# Patient Record
Sex: MALE | Race: ASIAN | NOT HISPANIC OR LATINO | Employment: FULL TIME | URBAN - METROPOLITAN AREA
[De-identification: names, ages, dates, MRNs, and addresses within clinical notes are randomized per-mention and may not be internally consistent; named-entity substitution may affect disease eponyms.]

---

## 2017-03-10 ENCOUNTER — PATIENT MESSAGE (OUTPATIENT)
Dept: INTERNAL MEDICINE | Facility: CLINIC | Age: 72
End: 2017-03-10

## 2017-03-10 ENCOUNTER — LAB VISIT (OUTPATIENT)
Dept: LAB | Facility: HOSPITAL | Age: 72
End: 2017-03-10
Attending: INTERNAL MEDICINE
Payer: MEDICARE

## 2017-03-10 DIAGNOSIS — D62 ACUTE BLOOD LOSS ANEMIA: ICD-10-CM

## 2017-03-10 DIAGNOSIS — D62 ACUTE BLOOD LOSS ANEMIA: Primary | ICD-10-CM

## 2017-03-10 DIAGNOSIS — K57.30 DIVERTICULOSIS OF COLON (WITHOUT MENTION OF HEMORRHAGE): ICD-10-CM

## 2017-03-10 LAB
ALBUMIN SERPL BCP-MCNC: 3.3 G/DL
ALP SERPL-CCNC: 116 U/L
ALT SERPL W/O P-5'-P-CCNC: 7 U/L
ANION GAP SERPL CALC-SCNC: 8 MMOL/L
AST SERPL-CCNC: 20 U/L
BASOPHILS # BLD AUTO: 0.06 K/UL
BASOPHILS NFR BLD: 0.7 %
BILIRUB SERPL-MCNC: 0.5 MG/DL
BUN SERPL-MCNC: 12 MG/DL
CALCIUM SERPL-MCNC: 9.1 MG/DL
CHLORIDE SERPL-SCNC: 106 MMOL/L
CHOLEST/HDLC SERPL: 3.6 {RATIO}
CO2 SERPL-SCNC: 27 MMOL/L
CREAT SERPL-MCNC: 1.3 MG/DL
DIFFERENTIAL METHOD: ABNORMAL
EOSINOPHIL # BLD AUTO: 0.6 K/UL
EOSINOPHIL NFR BLD: 7.1 %
ERYTHROCYTE [DISTWIDTH] IN BLOOD BY AUTOMATED COUNT: 14 %
EST. GFR  (AFRICAN AMERICAN): >60 ML/MIN/1.73 M^2
EST. GFR  (NON AFRICAN AMERICAN): 55.3 ML/MIN/1.73 M^2
ESTIMATED AVG GLUCOSE: 117 MG/DL
FERRITIN SERPL-MCNC: 15 NG/ML
GLUCOSE SERPL-MCNC: 106 MG/DL
HBA1C MFR BLD HPLC: 5.7 %
HCT VFR BLD AUTO: 34.8 %
HDL/CHOLESTEROL RATIO: 27.9 %
HDLC SERPL-MCNC: 104 MG/DL
HDLC SERPL-MCNC: 29 MG/DL
HGB BLD-MCNC: 10.9 G/DL
IRON SERPL-MCNC: 22 UG/DL
LDLC SERPL CALC-MCNC: 42.2 MG/DL
LYMPHOCYTES # BLD AUTO: 1.8 K/UL
LYMPHOCYTES NFR BLD: 20.3 %
MCH RBC QN AUTO: 26 PG
MCHC RBC AUTO-ENTMCNC: 31.3 %
MCV RBC AUTO: 83 FL
MONOCYTES # BLD AUTO: 0.8 K/UL
MONOCYTES NFR BLD: 8.6 %
NEUTROPHILS # BLD AUTO: 5.7 K/UL
NEUTROPHILS NFR BLD: 63.3 %
NONHDLC SERPL-MCNC: 75 MG/DL
PLATELET # BLD AUTO: 261 K/UL
PMV BLD AUTO: 10.5 FL
POTASSIUM SERPL-SCNC: 4.8 MMOL/L
PROSTATE SPECIFIC ANTIGEN, TOTAL: 1.4 NG/ML
PROT SERPL-MCNC: 7.4 G/DL
PSA FREE MFR SERPL: 37.14 %
PSA FREE SERPL-MCNC: 0.52 NG/ML
RBC # BLD AUTO: 4.19 M/UL
SATURATED IRON: 5 %
SODIUM SERPL-SCNC: 141 MMOL/L
TOTAL IRON BINDING CAPACITY: 437 UG/DL
TRANSFERRIN SERPL-MCNC: 295 MG/DL
TRIGL SERPL-MCNC: 164 MG/DL
WBC # BLD AUTO: 9.02 K/UL

## 2017-03-10 PROCEDURE — 83036 HEMOGLOBIN GLYCOSYLATED A1C: CPT

## 2017-03-10 PROCEDURE — 82728 ASSAY OF FERRITIN: CPT

## 2017-03-10 PROCEDURE — 80053 COMPREHEN METABOLIC PANEL: CPT

## 2017-03-10 PROCEDURE — 83540 ASSAY OF IRON: CPT

## 2017-03-10 PROCEDURE — 80061 LIPID PANEL: CPT

## 2017-03-10 PROCEDURE — 84153 ASSAY OF PSA TOTAL: CPT

## 2017-03-10 PROCEDURE — 36415 COLL VENOUS BLD VENIPUNCTURE: CPT | Mod: PO

## 2017-03-10 PROCEDURE — 85025 COMPLETE CBC W/AUTO DIFF WBC: CPT | Mod: PO

## 2017-03-16 ENCOUNTER — OFFICE VISIT (OUTPATIENT)
Dept: INTERNAL MEDICINE | Facility: CLINIC | Age: 72
End: 2017-03-16
Payer: MEDICARE

## 2017-03-16 VITALS
BODY MASS INDEX: 24.45 KG/M2 | WEIGHT: 151.44 LBS | HEART RATE: 82 BPM | OXYGEN SATURATION: 99 % | SYSTOLIC BLOOD PRESSURE: 128 MMHG | DIASTOLIC BLOOD PRESSURE: 70 MMHG

## 2017-03-16 DIAGNOSIS — E78.2 MIXED HYPERLIPIDEMIA: ICD-10-CM

## 2017-03-16 DIAGNOSIS — K57.31 DIVERTICULOSIS OF LARGE INTESTINE WITH HEMORRHAGE: ICD-10-CM

## 2017-03-16 DIAGNOSIS — E11.9 TYPE 2 DIABETES MELLITUS WITHOUT COMPLICATION, WITHOUT LONG-TERM CURRENT USE OF INSULIN: ICD-10-CM

## 2017-03-16 DIAGNOSIS — E55.9 VITAMIN D DEFICIENCY DISEASE: Primary | ICD-10-CM

## 2017-03-16 DIAGNOSIS — I10 ESSENTIAL HYPERTENSION: ICD-10-CM

## 2017-03-16 DIAGNOSIS — D50.9 IRON DEFICIENCY ANEMIA, UNSPECIFIED IRON DEFICIENCY ANEMIA TYPE: ICD-10-CM

## 2017-03-16 PROCEDURE — 99214 OFFICE O/P EST MOD 30 MIN: CPT | Mod: S$PBB,,, | Performed by: INTERNAL MEDICINE

## 2017-03-16 PROCEDURE — 99213 OFFICE O/P EST LOW 20 MIN: CPT | Mod: PBBFAC | Performed by: INTERNAL MEDICINE

## 2017-03-16 PROCEDURE — 99999 PR PBB SHADOW E&M-EST. PATIENT-LVL III: CPT | Mod: PBBFAC,,, | Performed by: INTERNAL MEDICINE

## 2017-03-16 RX ORDER — ERGOCALCIFEROL 1.25 MG/1
50000 CAPSULE ORAL
Qty: 12 CAPSULE | Refills: 3 | Status: SHIPPED | OUTPATIENT
Start: 2017-03-16 | End: 2017-09-19 | Stop reason: SDUPTHER

## 2017-03-17 NOTE — PROGRESS NOTES
This is a well known patient of mine coming back from [place] after having a   diverticular bleed requiring blood transfusion x2, not sure of the mL and then a   subtotal colectomy laparoscopically done in [place] and had a GI bleed in late   January on the 30th of January ended up having a subtotal colectomy.  They tried   embolization of the diverticular bleed, but they could not find it.  He spent   another month in [place] and came back earlier this month, he says he is feeling   well.  Energy level has been improving.  He is not having any abdominal pain.    He has stopped metformin for diabetes and his Cozaar because his blood pressure   has been on the low side.  Blood pressure today is 128/70, hemoglobin A1c is   5.7.  He has lost about 20 pounds with the surgery and afterwards, but his   weight has come back up.  The last time I saw him it was 163 pounds it had come   down to 151.  Recent labs showed that H and H 10.9 and 34.8.  He is a vegetarian   has been taking oral iron.  He lives with his wife who has been taking care of   him.  She has been trying to get him eat more protein.  His albumin recently was   3.3.  He said he is no longer losing weight and has lacked that tendency and   gaining weight back.    REVIEW OF SYSTEMS:  No nausea, vomiting, palpitations.  No PND or orthopnea.    PHYSICAL EXAMINATION:  GENERAL:  He is a well-appearing 71-year-old gentleman in no acute distress.  NECK:  Supple.  CHEST:  Clear without wheeze.  CARDIOVASCULAR:  S1 and S2, regular rate and rhythm.  ABDOMEN:  Soft, nontender.  He had some laparoscopic scars, but bowel sounds are   normal.  LOWER EXTREMITIES:  No edema.  Overall, he looks good and jumps up and off the   table without any difficulty.    ASSESSMENT:  Vitamin D deficiency, diabetes without complication,   hyperlipidemia, hypertension, iron deficiency anemia status post diverticular   hemorrhage and status post subtotal colectomy.  I will get the reports from     Chet, he is going to send it to me, I will go through.  We will monitor his H   and H.  We will monitor blood pressure and his glucose, will leave him off the   losartan and the metformin at the current time.  Continue iron for now.  I will   follow him.  I do recommend restarting his vitamin D though he has gotten off   that and I would recommend starting that back.      YVETTE  dd: 03/16/2017 23:10:12 (CDT)  td: 03/17/2017 07:00:19 (CDT)  Doc ID   #[id number]  Job ID #[id number]    CC:

## 2017-03-22 ENCOUNTER — PATIENT MESSAGE (OUTPATIENT)
Dept: INTERNAL MEDICINE | Facility: CLINIC | Age: 72
End: 2017-03-22

## 2017-03-23 RX ORDER — PANTOPRAZOLE SODIUM 20 MG/1
20 TABLET, DELAYED RELEASE ORAL DAILY
Qty: 30 TABLET | Refills: 0 | Status: SHIPPED | OUTPATIENT
Start: 2017-03-23 | End: 2017-04-19 | Stop reason: SDUPTHER

## 2017-03-23 NOTE — TELEPHONE ENCOUNTER
Please advise       Rashard,  I forgot to mention that I am on  Pantaprazole . Could you kindly send a prescription to [facility]?.   You looked great with the [facility] outfit!!   David

## 2017-03-29 DIAGNOSIS — E11.9 TYPE 2 DIABETES MELLITUS WITHOUT COMPLICATION: ICD-10-CM

## 2017-04-19 RX ORDER — PANTOPRAZOLE SODIUM 20 MG/1
TABLET, DELAYED RELEASE ORAL
Qty: 30 TABLET | Refills: 0 | Status: SHIPPED | OUTPATIENT
Start: 2017-04-19 | End: 2017-06-30 | Stop reason: SDUPTHER

## 2017-05-01 ENCOUNTER — PATIENT MESSAGE (OUTPATIENT)
Dept: ORTHOPEDICS | Facility: CLINIC | Age: 72
End: 2017-05-01

## 2017-05-01 ENCOUNTER — PATIENT MESSAGE (OUTPATIENT)
Dept: INTERNAL MEDICINE | Facility: CLINIC | Age: 72
End: 2017-05-01

## 2017-05-02 ENCOUNTER — TELEPHONE (OUTPATIENT)
Dept: INTERNAL MEDICINE | Facility: CLINIC | Age: 72
End: 2017-05-02

## 2017-06-16 ENCOUNTER — LAB VISIT (OUTPATIENT)
Dept: LAB | Facility: HOSPITAL | Age: 72
End: 2017-06-16
Attending: INTERNAL MEDICINE
Payer: MEDICARE

## 2017-06-16 DIAGNOSIS — E78.2 MIXED HYPERLIPIDEMIA: ICD-10-CM

## 2017-06-16 DIAGNOSIS — E11.9 TYPE 2 DIABETES MELLITUS WITHOUT COMPLICATION, WITHOUT LONG-TERM CURRENT USE OF INSULIN: ICD-10-CM

## 2017-06-16 DIAGNOSIS — E55.9 VITAMIN D DEFICIENCY DISEASE: ICD-10-CM

## 2017-06-16 DIAGNOSIS — D50.9 IRON DEFICIENCY ANEMIA, UNSPECIFIED IRON DEFICIENCY ANEMIA TYPE: ICD-10-CM

## 2017-06-16 LAB
25(OH)D3+25(OH)D2 SERPL-MCNC: 27 NG/ML
ALBUMIN SERPL BCP-MCNC: 3.4 G/DL
ALP SERPL-CCNC: 128 U/L
ALT SERPL W/O P-5'-P-CCNC: 8 U/L
ANION GAP SERPL CALC-SCNC: 6 MMOL/L
AST SERPL-CCNC: 15 U/L
BASOPHILS # BLD AUTO: 0.1 K/UL
BASOPHILS NFR BLD: 1.2 %
BILIRUB SERPL-MCNC: 0.4 MG/DL
BUN SERPL-MCNC: 15 MG/DL
CALCIUM SERPL-MCNC: 8.8 MG/DL
CHLORIDE SERPL-SCNC: 108 MMOL/L
CHOLEST/HDLC SERPL: 4.3 {RATIO}
CO2 SERPL-SCNC: 25 MMOL/L
CREAT SERPL-MCNC: 1.3 MG/DL
DIFFERENTIAL METHOD: ABNORMAL
EOSINOPHIL # BLD AUTO: 1.2 K/UL
EOSINOPHIL NFR BLD: 13.4 %
ERYTHROCYTE [DISTWIDTH] IN BLOOD BY AUTOMATED COUNT: 17.6 %
EST. GFR  (AFRICAN AMERICAN): >60 ML/MIN/1.73 M^2
EST. GFR  (NON AFRICAN AMERICAN): 54.9 ML/MIN/1.73 M^2
ESTIMATED AVG GLUCOSE: 148 MG/DL
GLUCOSE SERPL-MCNC: 125 MG/DL
HBA1C MFR BLD HPLC: 6.8 %
HCT VFR BLD AUTO: 39.5 %
HDL/CHOLESTEROL RATIO: 23.2 %
HDLC SERPL-MCNC: 125 MG/DL
HDLC SERPL-MCNC: 29 MG/DL
HGB BLD-MCNC: 11.9 G/DL
LDLC SERPL CALC-MCNC: ABNORMAL MG/DL
LYMPHOCYTES # BLD AUTO: 2.2 K/UL
LYMPHOCYTES NFR BLD: 25.2 %
MCH RBC QN AUTO: 22.8 PG
MCHC RBC AUTO-ENTMCNC: 30.1 %
MCV RBC AUTO: 76 FL
MONOCYTES # BLD AUTO: 0.7 K/UL
MONOCYTES NFR BLD: 8.1 %
NEUTROPHILS # BLD AUTO: 4.5 K/UL
NEUTROPHILS NFR BLD: 52.1 %
NONHDLC SERPL-MCNC: 96 MG/DL
PLATELET # BLD AUTO: 195 K/UL
PMV BLD AUTO: ABNORMAL FL
POTASSIUM SERPL-SCNC: 5.1 MMOL/L
PROT SERPL-MCNC: 7.4 G/DL
RBC # BLD AUTO: 5.21 M/UL
SODIUM SERPL-SCNC: 139 MMOL/L
TRIGL SERPL-MCNC: 409 MG/DL
WBC # BLD AUTO: 8.6 K/UL

## 2017-06-16 PROCEDURE — 83036 HEMOGLOBIN GLYCOSYLATED A1C: CPT

## 2017-06-16 PROCEDURE — 80053 COMPREHEN METABOLIC PANEL: CPT

## 2017-06-16 PROCEDURE — 80061 LIPID PANEL: CPT

## 2017-06-16 PROCEDURE — 85025 COMPLETE CBC W/AUTO DIFF WBC: CPT

## 2017-06-16 PROCEDURE — 36415 COLL VENOUS BLD VENIPUNCTURE: CPT

## 2017-06-16 PROCEDURE — 82306 VITAMIN D 25 HYDROXY: CPT

## 2017-06-22 ENCOUNTER — OFFICE VISIT (OUTPATIENT)
Dept: INTERNAL MEDICINE | Facility: CLINIC | Age: 72
End: 2017-06-22
Payer: MEDICARE

## 2017-06-22 VITALS
SYSTOLIC BLOOD PRESSURE: 123 MMHG | DIASTOLIC BLOOD PRESSURE: 71 MMHG | OXYGEN SATURATION: 97 % | HEART RATE: 74 BPM | WEIGHT: 160.06 LBS | HEIGHT: 66 IN | BODY MASS INDEX: 25.72 KG/M2

## 2017-06-22 DIAGNOSIS — I77.9 RIGHT-SIDED CAROTID ARTERY DISEASE: ICD-10-CM

## 2017-06-22 DIAGNOSIS — D64.9 ANEMIA, UNSPECIFIED TYPE: ICD-10-CM

## 2017-06-22 DIAGNOSIS — E11.9 TYPE 2 DIABETES MELLITUS WITHOUT COMPLICATION, WITHOUT LONG-TERM CURRENT USE OF INSULIN: ICD-10-CM

## 2017-06-22 DIAGNOSIS — I10 ESSENTIAL HYPERTENSION: Primary | ICD-10-CM

## 2017-06-22 DIAGNOSIS — I67.2 CEREBRAL ATHEROSCLEROSIS: ICD-10-CM

## 2017-06-22 DIAGNOSIS — E78.2 MIXED DYSLIPIDEMIA: ICD-10-CM

## 2017-06-22 LAB
CREAT UR-MCNC: 154 MG/DL
MICROALBUMIN UR DL<=1MG/L-MCNC: 14 UG/ML
MICROALBUMIN/CREATININE RATIO: 9.1 UG/MG

## 2017-06-22 PROCEDURE — 82570 ASSAY OF URINE CREATININE: CPT

## 2017-06-22 PROCEDURE — 99214 OFFICE O/P EST MOD 30 MIN: CPT | Mod: S$PBB,,, | Performed by: INTERNAL MEDICINE

## 2017-06-22 PROCEDURE — 3044F HG A1C LEVEL LT 7.0%: CPT | Mod: ,,, | Performed by: INTERNAL MEDICINE

## 2017-06-22 PROCEDURE — 4010F ACE/ARB THERAPY RXD/TAKEN: CPT | Mod: ,,, | Performed by: INTERNAL MEDICINE

## 2017-06-22 PROCEDURE — 1126F AMNT PAIN NOTED NONE PRSNT: CPT | Mod: ,,, | Performed by: INTERNAL MEDICINE

## 2017-06-22 PROCEDURE — 99999 PR PBB SHADOW E&M-EST. PATIENT-LVL IV: CPT | Mod: PBBFAC,,, | Performed by: INTERNAL MEDICINE

## 2017-06-22 PROCEDURE — 1159F MED LIST DOCD IN RCRD: CPT | Mod: ,,, | Performed by: INTERNAL MEDICINE

## 2017-06-22 PROCEDURE — 99214 OFFICE O/P EST MOD 30 MIN: CPT | Mod: PBBFAC | Performed by: INTERNAL MEDICINE

## 2017-06-22 RX ORDER — LOSARTAN POTASSIUM 25 MG/1
25 TABLET ORAL DAILY
COMMUNITY
End: 2017-09-19 | Stop reason: SDUPTHER

## 2017-06-22 NOTE — PROGRESS NOTES
Answers for HPI/ROS submitted by the patient on 6/21/2017   Diabetes problem  Diabetes type: type 2  MedicAlert ID: No  Disease duration: 5 years  blurred vision: No  chest pain: No  fatigue: No  foot paresthesias: No  foot ulcerations: No  polydipsia: Yes  polyphagia: Yes  polyuria: No  visual change: No  weakness: No  weight loss: Yes  Symptom course: stable  confusion: No  dizziness: No  headaches: No  hunger: Yes  mood changes: No  nervous/anxious: No  pallor: Yes  seizures: No  sleepiness: No  speech difficulty: No  sweats: No  tremors: No  blackouts: No  hospitalization: No  nocturnal hypoglycemia: No  required assistance: No  required glucagon: No  CVA: No  heart disease: No  impotence: Yes  nephropathy: No  peripheral neuropathy: No  PVD: No  retinopathy: No  autonomic neuropathy: No  CAD risks: stress, diabetes mellitus, male sex  Current treatments: oral agent (monotherapy)  Treatment compliance: all of the time  Blood glucose trend: fluctuating minimally  Weight trend: decreasing steadily  Current diet: vegetarian  Meal planning: none  Exercise: weekly  Dietitian visit: No  Eye exam current: No  Sees podiatrist: No  HISTORY OF PRESENT ILLNESS:  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  Had a little incident   where he went to [place] last year, had significant diverticulosis episode,   hospitalized and had a hemicolectomy, doing well.  He had lost about 15 pounds.    He gained about 9 pounds back already.  He says he has been eating more.  He is   a vegetarian, but gained about 9 pounds back.  His glucose for his diabetes has   shown increase also.  Glucose fasting is 125, hemoglobin A1c up to 6.8, last   time   5.7 after his incident, normally he runs around 6.3, so it is not   really that big difference.  His triglycerides have bumped up despite having   diabetes.  He is not on a statin.  With his triglycerides being high, I cannot   tell  what his LDL looks like.  At this time, it usually runs around 42-49, so he   did not qualify for a statin.  He does have a history of carotid stenosis  ,   has not had a carotid ultrasound in a while.  Last one was in 2012.  He has had   no CVA or TIA type symptoms.  He has also had some mild anemia.  Says he has not   been taking any iron since he has lost a lot of blood, requiring blood   transfusions before last visit, but his H and H is actually going up.  H and H   recently was 11.9 and 35.7, MCV has dropped down to 76.  Otherwise, he says he   is feeling well.  No new complaints.  He has started back on his losartan for   his hypertension, 25 mg a day.  Blood pressure today is 123/71.  He gotten off   of it for a while after he lost all his weight, but since he has gained some of   them back, his blood pressure has come back up.  He has not has any eosinophilia   that he says he has had forever 20 or 30 years.  He is not having any B-type   symptoms, not losing weight, no night sweats.  He also has vitamin D deficiency   for which he is taking vitamin D.    PHYSICAL EXAMINATION:  GENERAL:  Well-appearing  gentleman in no acute distress.  HEENT:  Ear canals are open.  TMs are clear.  Oropharynx is clear.  NECK:  Supple.  No JVD.  LUNGS:  Clear.  CARDIAC:  S1, S2.  ABDOMEN:  Soft.  LOWER EXTREMITIES:  No edema.  Does have neuroma on top of his left foot.    ASSESSMENT:  1.  Diabetes mellitus type 2.  Foot exam done today.  We will have him see  .    Hemoglobin A1c and CMP again in three months.  Watch his diet.  2.  Hypertension.  Continue losartan.  3.  History of carotid artery stenosis.  We are going to get a carotid artery   ultrasound, so we will order carotid ultrasound.  We will check the urine today.    Continue vitamin D.  We will check CBC.  He needs to start taking a little bit   iron in his diet and I will see him again in five months with labs.      TERESA/IN  dd: 06/22/2017 08:40:39 (CDT)  td:  06/23/2017 00:43:51 (CDT)  Doc ID   #[id number]  Job ID #[id number]    CC:        Protective Sensation (w/ 10 gram monofilament):  Right: Intact  Left: Intact    Visual Inspection:  Normal -  Bilateral    Pedal Pulses:   Right: Present  Left: Present    Posterior tibialis:   Right:Present  Left: Present

## 2017-06-30 RX ORDER — PANTOPRAZOLE SODIUM 20 MG/1
TABLET, DELAYED RELEASE ORAL
Qty: 30 TABLET | Refills: 0 | Status: SHIPPED | OUTPATIENT
Start: 2017-06-30 | End: 2017-07-31 | Stop reason: SDUPTHER

## 2017-07-07 ENCOUNTER — PATIENT MESSAGE (OUTPATIENT)
Dept: ORTHOPEDICS | Facility: CLINIC | Age: 72
End: 2017-07-07

## 2017-08-01 RX ORDER — PANTOPRAZOLE SODIUM 20 MG/1
TABLET, DELAYED RELEASE ORAL
Qty: 30 TABLET | Refills: 0 | Status: SHIPPED | OUTPATIENT
Start: 2017-08-01 | End: 2017-09-04 | Stop reason: SDUPTHER

## 2017-08-31 ENCOUNTER — OFFICE VISIT (OUTPATIENT)
Dept: OPTOMETRY | Facility: CLINIC | Age: 72
End: 2017-08-31
Payer: MEDICARE

## 2017-08-31 DIAGNOSIS — H43.812 PVD (POSTERIOR VITREOUS DETACHMENT), LEFT EYE: Primary | ICD-10-CM

## 2017-08-31 PROCEDURE — 99499 UNLISTED E&M SERVICE: CPT | Mod: S$PBB,,, | Performed by: OPTOMETRIST

## 2017-08-31 PROCEDURE — 99212 OFFICE O/P EST SF 10 MIN: CPT | Mod: PBBFAC,PO | Performed by: OPTOMETRIST

## 2017-08-31 PROCEDURE — 99999 PR PBB SHADOW E&M-EST. PATIENT-LVL II: CPT | Mod: PBBFAC,,, | Performed by: OPTOMETRIST

## 2017-08-31 NOTE — PATIENT INSTRUCTIONS
Posterior Vitreous Detachment    Posterior vitreous detachment, or PVD for short, is a degenerative change in the vitreous humor of the eye. It is very common and will occur in virtually everybody as age catches up. Most of the time, it occurs without causing any acute or long-term visual problems.  PVD is a natural change that occurs with age, usually starting from the age of 40. PVD happens in over 75% of those aged 65 or over. It is important to remember that PVD is not an ocular disease nor is it a sign of any impending eye health issue, unless it occurs at a relatively young age.      WHAT IS POSTERIOR VITREOUS DETACHMENT?  The vitreous humor is a transparent, gel-like substance that occupies entire back portion of the eyeball. This portion of the eye is called... (no prizes for guessing)... the vitreous cavity. At birth, the vitreous humor fills up the entire space in the cavity. With age, the vitreous degenerates and is no longer able to keep its original consistency and shape. When this happens, the vitreous shrinks and pulls away from the retina at the back wall of the eye. This separation of vitreous from retina is called posterior vitreous detachment.

## 2017-08-31 NOTE — PROGRESS NOTES
MARGE Rosenberg is a 72 y.o. Male who comes in for urgent eye care.    He reports that he has noticed a hair-like floater in his left eye.  This   has been happening for 1-2 weeks. There are no other associated symptoms.    (--)blurred vision  (--)Headaches  (--)diplopia  (--)flashes  (+)floaters  (--)pain  (--)Itching  (--)tearing  (--)burning  (--)Dryness  (--) OTC Drops  (--)Photophobia      Last edited by Harjit Gregg, OD on 8/31/2017  9:09 AM. (History)        ROS     Positive for: Gastrointestinal (h/o diverticulitis), Neurological   (migraine headaches), Skin (h/o non-melanoma skin cancer), Genitourinary   (Bph), Endocrine (Type 2 Diabetes), Cardiovascular (CAD, HLD, HTN), Eyes   (floater OS), Respiratory (asthma), Allergic/Imm (codeine, fenofibrate,   lisinopril, shellfish)    Negative for: Constitutional, Musculoskeletal, HENT, Psychiatric,   Heme/Lymph    Last edited by Harjit Gregg, OD on 8/31/2017  9:13 AM. (History)        Assessment /Plan     For exam results, see Encounter Report.    PVD (posterior vitreous detachment), left eye  - Retinal health intact  - Informed on continued risk of retinal detachment, holes, and tears.. Advised to Return to clinic, or Emergency room after hours or on weekends,  immediately with any new spontaneous flashes of light, a vail of gray, black or other color come over  vision, or any new floaters    - Recheck in 1 month      Patient education; RTC in 1 month for PVD check (DFE OS)

## 2017-09-04 RX ORDER — PANTOPRAZOLE SODIUM 20 MG/1
TABLET, DELAYED RELEASE ORAL
Qty: 30 TABLET | Refills: 3 | Status: SHIPPED | OUTPATIENT
Start: 2017-09-04 | End: 2017-09-19 | Stop reason: SDUPTHER

## 2017-09-08 ENCOUNTER — OFFICE VISIT (OUTPATIENT)
Dept: OPHTHALMOLOGY | Facility: CLINIC | Age: 72
End: 2017-09-08
Payer: MEDICARE

## 2017-09-08 DIAGNOSIS — H43.813 PVD (POSTERIOR VITREOUS DETACHMENT), BILATERAL: Primary | ICD-10-CM

## 2017-09-08 PROCEDURE — 99212 OFFICE O/P EST SF 10 MIN: CPT | Mod: PBBFAC | Performed by: OPHTHALMOLOGY

## 2017-09-08 PROCEDURE — 92012 INTRM OPH EXAM EST PATIENT: CPT | Mod: S$PBB,,, | Performed by: OPHTHALMOLOGY

## 2017-09-08 PROCEDURE — 99999 PR PBB SHADOW E&M-EST. PATIENT-LVL II: CPT | Mod: PBBFAC,,, | Performed by: OPHTHALMOLOGY

## 2017-09-08 NOTE — PROGRESS NOTES
HPI     Eye Problem    Additional comments: PVD f/u           Comments   DLS 8/31/17     Pt states the flashes are gone and no floaters. Declines dilation today,   was unable to see for 2 days last time. (they used Cyclo).         Last edited by Princess Virgen on 9/8/2017  1:37 PM. (History)            Assessment /Plan     For exam results, see Encounter Report.    PVD (posterior vitreous detachment), bilateral      Etiology of PVD discussed. Signs/symptoms of retinal tear and detachment reviewed.  Patient advised to return urgently if signs of retinal detachment occur.

## 2017-09-18 ENCOUNTER — PATIENT MESSAGE (OUTPATIENT)
Dept: CARDIOLOGY | Facility: CLINIC | Age: 72
End: 2017-09-18

## 2017-09-18 ENCOUNTER — PATIENT MESSAGE (OUTPATIENT)
Dept: ORTHOPEDICS | Facility: CLINIC | Age: 72
End: 2017-09-18

## 2017-09-18 ENCOUNTER — PATIENT MESSAGE (OUTPATIENT)
Dept: INTERNAL MEDICINE | Facility: CLINIC | Age: 72
End: 2017-09-18

## 2017-09-19 ENCOUNTER — PATIENT MESSAGE (OUTPATIENT)
Dept: ORTHOPEDICS | Facility: CLINIC | Age: 72
End: 2017-09-19

## 2017-09-19 RX ORDER — ERGOCALCIFEROL 1.25 MG/1
50000 CAPSULE ORAL
Qty: 12 CAPSULE | Refills: 3 | Status: SHIPPED | OUTPATIENT
Start: 2017-09-19 | End: 2019-04-15 | Stop reason: SDUPTHER

## 2017-09-19 RX ORDER — LOSARTAN POTASSIUM 25 MG/1
25 TABLET ORAL DAILY
Qty: 90 TABLET | Refills: 3 | Status: SHIPPED | OUTPATIENT
Start: 2017-09-19 | End: 2017-10-03 | Stop reason: SDUPTHER

## 2017-09-19 RX ORDER — PANTOPRAZOLE SODIUM 20 MG/1
20 TABLET, DELAYED RELEASE ORAL DAILY
Qty: 90 TABLET | Refills: 3 | Status: SHIPPED | OUTPATIENT
Start: 2017-09-19 | End: 2017-10-03 | Stop reason: SDUPTHER

## 2017-09-19 NOTE — TELEPHONE ENCOUNTER
Can you put in an order for urology and cardiology. Are the labs that you ordered on 6/22 okay to still schedule ? Please advise, thanks.

## 2017-09-20 ENCOUNTER — OFFICE VISIT (OUTPATIENT)
Dept: ORTHOPEDICS | Facility: CLINIC | Age: 72
End: 2017-09-20
Payer: MEDICARE

## 2017-09-20 ENCOUNTER — HOSPITAL ENCOUNTER (OUTPATIENT)
Dept: RADIOLOGY | Facility: HOSPITAL | Age: 72
Discharge: HOME | End: 2017-09-20
Attending: ORTHOPAEDIC SURGERY
Payer: MEDICARE

## 2017-09-20 DIAGNOSIS — M79.672 LEFT FOOT PAIN: ICD-10-CM

## 2017-09-20 DIAGNOSIS — M67.472 GANGLION CYST OF LEFT FOOT: ICD-10-CM

## 2017-09-20 DIAGNOSIS — M79.645 PAIN OF LEFT THUMB: ICD-10-CM

## 2017-09-20 DIAGNOSIS — M79.672 LEFT FOOT PAIN: Primary | ICD-10-CM

## 2017-09-20 PROCEDURE — 99999 PR PBB SHADOW E&M-EST. PATIENT-LVL I: CPT | Mod: PBBFAC,,, | Performed by: ORTHOPAEDIC SURGERY

## 2017-09-20 PROCEDURE — 73630 X-RAY EXAM OF FOOT: CPT | Mod: 26,LT,, | Performed by: RADIOLOGY

## 2017-09-20 PROCEDURE — 73130 X-RAY EXAM OF HAND: CPT | Mod: TC,LT

## 2017-09-20 PROCEDURE — 99213 OFFICE O/P EST LOW 20 MIN: CPT | Mod: S$PBB,,, | Performed by: ORTHOPAEDIC SURGERY

## 2017-09-20 PROCEDURE — 1159F MED LIST DOCD IN RCRD: CPT | Mod: ,,, | Performed by: ORTHOPAEDIC SURGERY

## 2017-09-20 PROCEDURE — 73630 X-RAY EXAM OF FOOT: CPT | Mod: TC,LT

## 2017-09-20 PROCEDURE — 73130 X-RAY EXAM OF HAND: CPT | Mod: 26,LT,, | Performed by: RADIOLOGY

## 2017-09-20 PROCEDURE — 99211 OFF/OP EST MAY X REQ PHY/QHP: CPT | Mod: PBBFAC,25 | Performed by: ORTHOPAEDIC SURGERY

## 2017-09-21 NOTE — PROGRESS NOTES
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  I had seen   him previously for this problem and we had discussed possible surgical excision   of the cyst if the pain persisted.  He states it has been bothering him lately.    In addition, he reports some discomfort around the base of his left thumb, not   associated with any recent injury.  He wanted me to have a look at this as well.    Examination of his left foot reveals obvious prominence of the midfoot with a   cystic type feeling on palpation with some mild tenderness.  He has bilateral   high arches.  There is no sign of infection.  His left thumb reveals some   tenderness around the CMC joint of the left thumb.  He has good motion and no   obvious instability.  I x-rayed his left foot again today and he indeed has   cavus feet with soft tissue prominence in the midfoot.  He does have some   degenerative changes of the second and third tarsometatarsal joints.  I also   x-rayed his thumb today and although I am not that experienced with hands, it   appears there is some subluxation of the first carpometacarpal joint.  With   regards to his foot, he is going to decide if and when he wants to have the cyst   removed.  With regards to his thumb, I am going to refer him to see Dr. Martínez.      TERRY/SUPRIYA  dd: 09/20/2017 13:57:12 (CDT)  td: 09/21/2017 07:00:25 (CDT)  Doc ID   #[id number]  Job ID #[id number]    CC:

## 2017-10-03 RX ORDER — PANTOPRAZOLE SODIUM 20 MG/1
20 TABLET, DELAYED RELEASE ORAL DAILY
Qty: 90 TABLET | Refills: 3 | Status: SHIPPED | OUTPATIENT
Start: 2017-10-03 | End: 2018-10-10

## 2017-10-03 RX ORDER — LOSARTAN POTASSIUM 25 MG/1
25 TABLET ORAL DAILY
Qty: 90 TABLET | Refills: 3 | Status: SHIPPED | OUTPATIENT
Start: 2017-10-03 | End: 2018-04-28

## 2017-10-13 ENCOUNTER — TELEPHONE (OUTPATIENT)
Dept: OPTOMETRY | Facility: CLINIC | Age: 72
End: 2017-10-13

## 2017-10-13 NOTE — TELEPHONE ENCOUNTER
Called pt and left message if he would like to schedule 1 month F/U appointment for PVD with Dr Gregg but noticed that he had seen Dr Gregg in the interim.

## 2018-02-28 DIAGNOSIS — E78.2 MIXED DYSLIPIDEMIA: ICD-10-CM

## 2018-02-28 DIAGNOSIS — Z12.5 ENCOUNTER FOR SCREENING FOR MALIGNANT NEOPLASM OF PROSTATE: ICD-10-CM

## 2018-02-28 DIAGNOSIS — E11.9 TYPE 2 DIABETES MELLITUS WITHOUT COMPLICATION, WITHOUT LONG-TERM CURRENT USE OF INSULIN: Primary | ICD-10-CM

## 2018-02-28 DIAGNOSIS — I77.9 RIGHT-SIDED CAROTID ARTERY DISEASE: ICD-10-CM

## 2018-02-28 DIAGNOSIS — I10 ESSENTIAL HYPERTENSION: ICD-10-CM

## 2018-03-01 ENCOUNTER — LAB VISIT (OUTPATIENT)
Dept: LAB | Facility: HOSPITAL | Age: 73
End: 2018-03-01
Attending: INTERNAL MEDICINE
Payer: MEDICARE

## 2018-03-01 DIAGNOSIS — I10 ESSENTIAL HYPERTENSION: ICD-10-CM

## 2018-03-01 DIAGNOSIS — E78.2 MIXED DYSLIPIDEMIA: ICD-10-CM

## 2018-03-01 DIAGNOSIS — Z12.5 ENCOUNTER FOR SCREENING FOR MALIGNANT NEOPLASM OF PROSTATE: ICD-10-CM

## 2018-03-01 DIAGNOSIS — E11.9 TYPE 2 DIABETES MELLITUS WITHOUT COMPLICATION, WITHOUT LONG-TERM CURRENT USE OF INSULIN: ICD-10-CM

## 2018-03-01 DIAGNOSIS — I77.9 RIGHT-SIDED CAROTID ARTERY DISEASE: ICD-10-CM

## 2018-03-01 LAB
ALBUMIN SERPL BCP-MCNC: 3.5 G/DL
ALP SERPL-CCNC: 138 U/L
ALT SERPL W/O P-5'-P-CCNC: 14 U/L
ANION GAP SERPL CALC-SCNC: 8 MMOL/L
AST SERPL-CCNC: 17 U/L
BASOPHILS # BLD AUTO: 0.06 K/UL
BASOPHILS NFR BLD: 0.6 %
BILIRUB SERPL-MCNC: 0.4 MG/DL
BUN SERPL-MCNC: 15 MG/DL
CALCIUM SERPL-MCNC: 9.1 MG/DL
CHLORIDE SERPL-SCNC: 109 MMOL/L
CHOLEST SERPL-MCNC: 145 MG/DL
CHOLEST/HDLC SERPL: 5.2 {RATIO}
CO2 SERPL-SCNC: 25 MMOL/L
COMPLEXED PSA SERPL-MCNC: 6.3 NG/ML
CREAT SERPL-MCNC: 1.3 MG/DL
DIFFERENTIAL METHOD: ABNORMAL
EOSINOPHIL # BLD AUTO: 1 K/UL
EOSINOPHIL NFR BLD: 10.2 %
ERYTHROCYTE [DISTWIDTH] IN BLOOD BY AUTOMATED COUNT: 14.2 %
EST. GFR  (AFRICAN AMERICAN): >60 ML/MIN/1.73 M^2
EST. GFR  (NON AFRICAN AMERICAN): 54.9 ML/MIN/1.73 M^2
ESTIMATED AVG GLUCOSE: 128 MG/DL
GLUCOSE SERPL-MCNC: 134 MG/DL
HBA1C MFR BLD HPLC: 6.1 %
HCT VFR BLD AUTO: 45 %
HDLC SERPL-MCNC: 28 MG/DL
HDLC SERPL: 19.3 %
HGB BLD-MCNC: 14.2 G/DL
IMM GRANULOCYTES # BLD AUTO: 0.04 K/UL
IMM GRANULOCYTES NFR BLD AUTO: 0.4 %
LDLC SERPL CALC-MCNC: ABNORMAL MG/DL
LYMPHOCYTES # BLD AUTO: 1.9 K/UL
LYMPHOCYTES NFR BLD: 20.4 %
MCH RBC QN AUTO: 27.1 PG
MCHC RBC AUTO-ENTMCNC: 31.6 G/DL
MCV RBC AUTO: 86 FL
MONOCYTES # BLD AUTO: 0.7 K/UL
MONOCYTES NFR BLD: 6.9 %
NEUTROPHILS # BLD AUTO: 5.8 K/UL
NEUTROPHILS NFR BLD: 61.5 %
NONHDLC SERPL-MCNC: 117 MG/DL
NRBC BLD-RTO: 0 /100 WBC
PLATELET # BLD AUTO: 169 K/UL
PMV BLD AUTO: 11.8 FL
POTASSIUM SERPL-SCNC: 5.3 MMOL/L
PROT SERPL-MCNC: 7.5 G/DL
RBC # BLD AUTO: 5.24 M/UL
SODIUM SERPL-SCNC: 142 MMOL/L
TRIGL SERPL-MCNC: 633 MG/DL
URATE SERPL-MCNC: 6.6 MG/DL
WBC # BLD AUTO: 9.42 K/UL

## 2018-03-01 PROCEDURE — 80061 LIPID PANEL: CPT

## 2018-03-01 PROCEDURE — 84550 ASSAY OF BLOOD/URIC ACID: CPT

## 2018-03-01 PROCEDURE — 84153 ASSAY OF PSA TOTAL: CPT

## 2018-03-01 PROCEDURE — 85025 COMPLETE CBC W/AUTO DIFF WBC: CPT

## 2018-03-01 PROCEDURE — 36415 COLL VENOUS BLD VENIPUNCTURE: CPT

## 2018-03-01 PROCEDURE — 83036 HEMOGLOBIN GLYCOSYLATED A1C: CPT

## 2018-03-01 PROCEDURE — 80053 COMPREHEN METABOLIC PANEL: CPT

## 2018-03-02 ENCOUNTER — TELEPHONE (OUTPATIENT)
Dept: CARDIOLOGY | Facility: CLINIC | Age: 73
End: 2018-03-02

## 2018-03-02 NOTE — TELEPHONE ENCOUNTER
Called Mr. Rosenberg and discussed lab results. Was notified of elevated PSA level and was told to go see a PCP or urologist. He stated his lab levels were fasting but ate a very large meal late the night before the lab work was drawn. He has already called his PCP to set up an appointment for follow up. All questions answered.

## 2018-04-13 ENCOUNTER — OFFICE VISIT (OUTPATIENT)
Dept: INTERNAL MEDICINE | Facility: CLINIC | Age: 73
End: 2018-04-13
Payer: MEDICARE

## 2018-04-13 VITALS
DIASTOLIC BLOOD PRESSURE: 70 MMHG | HEART RATE: 74 BPM | BODY MASS INDEX: 27.36 KG/M2 | WEIGHT: 164.25 LBS | SYSTOLIC BLOOD PRESSURE: 120 MMHG | HEIGHT: 65 IN

## 2018-04-13 DIAGNOSIS — I77.9 RIGHT-SIDED CAROTID ARTERY DISEASE: ICD-10-CM

## 2018-04-13 DIAGNOSIS — I77.89 OTHER SPECIFIED DISORDERS OF ARTERIES AND ARTERIOLES: ICD-10-CM

## 2018-04-13 DIAGNOSIS — E11.9 TYPE 2 DIABETES MELLITUS WITHOUT COMPLICATION, WITHOUT LONG-TERM CURRENT USE OF INSULIN: ICD-10-CM

## 2018-04-13 DIAGNOSIS — N40.0 BPH WITHOUT URINARY OBSTRUCTION: Primary | ICD-10-CM

## 2018-04-13 DIAGNOSIS — E78.2 MIXED DYSLIPIDEMIA: ICD-10-CM

## 2018-04-13 LAB
BILIRUB UR QL STRIP: NEGATIVE
CLARITY UR REFRACT.AUTO: CLEAR
COLOR UR AUTO: YELLOW
CREAT UR-MCNC: 102 MG/DL
GLUCOSE UR QL STRIP: NEGATIVE
HGB UR QL STRIP: NEGATIVE
KETONES UR QL STRIP: NEGATIVE
LEUKOCYTE ESTERASE UR QL STRIP: NEGATIVE
MICROALBUMIN UR DL<=1MG/L-MCNC: 9 UG/ML
MICROALBUMIN/CREATININE RATIO: 8.8 UG/MG
MICROSCOPIC COMMENT: NORMAL
NITRITE UR QL STRIP: NEGATIVE
PH UR STRIP: 5 [PH] (ref 5–8)
PROT UR QL STRIP: NEGATIVE
SP GR UR STRIP: 1.01 (ref 1–1.03)
URN SPEC COLLECT METH UR: NORMAL
UROBILINOGEN UR STRIP-ACNC: NEGATIVE EU/DL
WBC #/AREA URNS AUTO: 3 /HPF (ref 0–5)

## 2018-04-13 PROCEDURE — 82043 UR ALBUMIN QUANTITATIVE: CPT

## 2018-04-13 PROCEDURE — 81001 URINALYSIS AUTO W/SCOPE: CPT

## 2018-04-13 PROCEDURE — 99999 PR PBB SHADOW E&M-EST. PATIENT-LVL III: CPT | Mod: PBBFAC,,, | Performed by: INTERNAL MEDICINE

## 2018-04-13 PROCEDURE — 99213 OFFICE O/P EST LOW 20 MIN: CPT | Mod: PBBFAC | Performed by: INTERNAL MEDICINE

## 2018-04-13 PROCEDURE — 99214 OFFICE O/P EST MOD 30 MIN: CPT | Mod: S$PBB,,, | Performed by: INTERNAL MEDICINE

## 2018-04-15 ENCOUNTER — PATIENT MESSAGE (OUTPATIENT)
Dept: CARDIOLOGY | Facility: CLINIC | Age: 73
End: 2018-04-15

## 2018-04-16 ENCOUNTER — LAB VISIT (OUTPATIENT)
Dept: LAB | Facility: HOSPITAL | Age: 73
End: 2018-04-16
Attending: INTERNAL MEDICINE
Payer: MEDICARE

## 2018-04-16 DIAGNOSIS — E11.9 TYPE 2 DIABETES MELLITUS WITHOUT COMPLICATION, WITHOUT LONG-TERM CURRENT USE OF INSULIN: ICD-10-CM

## 2018-04-16 DIAGNOSIS — I77.9 RIGHT-SIDED CAROTID ARTERY DISEASE: Primary | ICD-10-CM

## 2018-04-16 DIAGNOSIS — E78.2 MIXED DYSLIPIDEMIA: ICD-10-CM

## 2018-04-16 DIAGNOSIS — N40.0 BPH WITHOUT URINARY OBSTRUCTION: ICD-10-CM

## 2018-04-16 LAB
ANION GAP SERPL CALC-SCNC: 6 MMOL/L
BUN SERPL-MCNC: 12 MG/DL
CALCIUM SERPL-MCNC: 8.9 MG/DL
CHLORIDE SERPL-SCNC: 108 MMOL/L
CHOLEST SERPL-MCNC: 143 MG/DL
CHOLEST/HDLC SERPL: 4.8 {RATIO}
CO2 SERPL-SCNC: 26 MMOL/L
CREAT SERPL-MCNC: 1.2 MG/DL
EST. GFR  (AFRICAN AMERICAN): >60 ML/MIN/1.73 M^2
EST. GFR  (NON AFRICAN AMERICAN): >60 ML/MIN/1.73 M^2
ESTIMATED AVG GLUCOSE: 137 MG/DL
GLUCOSE SERPL-MCNC: 141 MG/DL
HBA1C MFR BLD HPLC: 6.4 %
HDLC SERPL-MCNC: 30 MG/DL
HDLC SERPL: 21 %
LDLC SERPL CALC-MCNC: 38.8 MG/DL
NONHDLC SERPL-MCNC: 113 MG/DL
POTASSIUM SERPL-SCNC: 5.6 MMOL/L
PROSTATE SPECIFIC ANTIGEN, TOTAL: 4.7 NG/ML
PSA FREE MFR SERPL: 40.64 %
PSA FREE SERPL-MCNC: 1.91 NG/ML
SODIUM SERPL-SCNC: 140 MMOL/L
TRIGL SERPL-MCNC: 371 MG/DL
URATE SERPL-MCNC: 6.9 MG/DL

## 2018-04-16 PROCEDURE — 80061 LIPID PANEL: CPT

## 2018-04-16 PROCEDURE — 80048 BASIC METABOLIC PNL TOTAL CA: CPT

## 2018-04-16 PROCEDURE — 83036 HEMOGLOBIN GLYCOSYLATED A1C: CPT

## 2018-04-16 PROCEDURE — 36415 COLL VENOUS BLD VENIPUNCTURE: CPT

## 2018-04-16 PROCEDURE — 84550 ASSAY OF BLOOD/URIC ACID: CPT

## 2018-04-16 PROCEDURE — 84154 ASSAY OF PSA FREE: CPT

## 2018-04-17 ENCOUNTER — HOSPITAL ENCOUNTER (OUTPATIENT)
Dept: CARDIOLOGY | Facility: CLINIC | Age: 73
Discharge: HOME | End: 2018-04-17
Attending: NURSE PRACTITIONER
Payer: MEDICARE

## 2018-04-17 ENCOUNTER — OFFICE VISIT (OUTPATIENT)
Dept: CARDIOLOGY | Facility: CLINIC | Age: 73
End: 2018-04-17
Payer: MEDICARE

## 2018-04-17 VITALS
HEIGHT: 65 IN | OXYGEN SATURATION: 98 % | WEIGHT: 162.94 LBS | BODY MASS INDEX: 27.15 KG/M2 | DIASTOLIC BLOOD PRESSURE: 69 MMHG | SYSTOLIC BLOOD PRESSURE: 106 MMHG | HEART RATE: 81 BPM

## 2018-04-17 DIAGNOSIS — N40.0 BPH WITHOUT URINARY OBSTRUCTION: ICD-10-CM

## 2018-04-17 DIAGNOSIS — E78.2 MIXED DYSLIPIDEMIA: ICD-10-CM

## 2018-04-17 DIAGNOSIS — I10 ESSENTIAL HYPERTENSION: ICD-10-CM

## 2018-04-17 DIAGNOSIS — E11.9 TYPE 2 DIABETES MELLITUS WITHOUT COMPLICATION, WITHOUT LONG-TERM CURRENT USE OF INSULIN: ICD-10-CM

## 2018-04-17 DIAGNOSIS — I77.9 RIGHT-SIDED CAROTID ARTERY DISEASE: ICD-10-CM

## 2018-04-17 DIAGNOSIS — I77.9 BILATERAL CAROTID ARTERY DISEASE: ICD-10-CM

## 2018-04-17 PROCEDURE — 99213 OFFICE O/P EST LOW 20 MIN: CPT | Mod: PBBFAC | Performed by: INTERNAL MEDICINE

## 2018-04-17 PROCEDURE — 99999 PR PBB SHADOW E&M-EST. PATIENT-LVL III: CPT | Mod: PBBFAC,,, | Performed by: INTERNAL MEDICINE

## 2018-04-17 PROCEDURE — 99214 OFFICE O/P EST MOD 30 MIN: CPT | Mod: S$PBB,,, | Performed by: INTERNAL MEDICINE

## 2018-04-17 RX ORDER — FENOFIBRATE 160 MG/1
160 TABLET ORAL DAILY
Qty: 90 TABLET | Refills: 3 | Status: SHIPPED | OUTPATIENT
Start: 2018-04-17 | End: 2019-01-29

## 2018-04-17 RX ORDER — NITROGLYCERIN 0.4 MG/1
0.4 TABLET SUBLINGUAL EVERY 5 MIN PRN
Qty: 30 TABLET | Refills: 11 | Status: SHIPPED | OUTPATIENT
Start: 2018-04-17 | End: 2019-04-23 | Stop reason: SDUPTHER

## 2018-04-17 NOTE — ASSESSMENT & PLAN NOTE
Lipids improved with diet modifications.   Lab Results   Component Value Date    CHOL 143 04/16/2018    CHOL 145 03/01/2018      HDL 30 (L) 04/16/2018    HDL 28 (L) 03/01/2018      LDLCALC 38.8 (L) 04/16/2018    LDLCALC Invalid, Trig>400.0 03/01/2018      TRIG 371 (H) 04/16/2018    TRIG 633 (H) 03/01/2018     Triglycerides still elevated -- will restart fenofibrate.   Continue diet and exercise.

## 2018-04-17 NOTE — PROGRESS NOTES
Subjective:   Patient ID:  Rajasekhyu Rosenberg is a 72 y.o. 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 He recently had an elevated PSA for which he is followed by Urology. He was prescribed levofloxacin for presumed prostatitis and began to develop calf cramping -- R>L. He is concerned about tendonitis.     His last lipids were markedly elevated -- he has made some diet modifications and his lipids have improved.     He is still playing tennis regularly.     Review of Systems   Constitution: Negative for chills, diaphoresis, fever, weakness, weight gain and weight loss.   HENT: Negative for sore throat.    Eyes: Negative for blurred vision, vision loss in left eye, vision loss in right eye and visual disturbance.   Cardiovascular: Negative for chest pain, claudication, cyanosis, dyspnea on exertion, irregular heartbeat, leg swelling, near-syncope, orthopnea, palpitations, paroxysmal nocturnal dyspnea and syncope.   Respiratory: Negative for cough, hemoptysis, shortness of breath, sputum production and wheezing.    Endocrine: Negative for cold intolerance, heat intolerance, polydipsia and polyuria.   Hematologic/Lymphatic: Negative for adenopathy. Does not bruise/bleed easily.   Skin: Negative for color change, dry skin and rash.   Musculoskeletal: Negative for falls, muscle cramps, muscle weakness and myalgias.   Gastrointestinal: Negative for abdominal pain, change in bowel habit, constipation, diarrhea, heartburn, hematemesis, hematochezia, melena and nausea.   Genitourinary: Negative for bladder incontinence.   Neurological: Negative for brief paralysis, dizziness, focal weakness, headaches, light-headedness, numbness and paresthesias.   Psychiatric/Behavioral: Negative for altered mental status.   Allergic/Immunologic: Negative for environmental allergies.     Allergies and current medications updated and reviewed:  Review of patient's allergies  "indicates:   Allergen Reactions    Codeine     Fenofibrate Nausea Only    Lisinopril      Cough    Shellfish containing products Swelling     Current Outpatient Prescriptions   Medication Sig Dispense Refill    albuterol 90 mcg/actuation inhaler Inhale 2 puffs into the lungs every 6 (six) hours as needed for Wheezing. 1 each 11    cyanocobalamin (VITAMIN B-12) 1000 MCG tablet Take 100 mcg by mouth once daily.      epinephrine (EPIPEN) 0.3 mg/0.3 mL (1:1,000) AtIn Inject 0.3 mLs (0.3 mg total) into the muscle once as needed. 1 Device 12    ergocalciferol (ERGOCALCIFEROL) 50,000 unit Cap Take 1 capsule (50,000 Units total) by mouth every 7 days. 12 capsule 3    LUZMARIA 0.5-0.4 mg CM24 TAKE 1 CAPSULE BY MOUTH EVERY DAY 30 capsule 0    losartan (COZAAR) 25 MG tablet Take 1 tablet (25 mg total) by mouth once daily. 90 tablet 3    NITROSTAT 0.4 mg SL tablet Place 1 tablet (0.4 mg total) under the tongue every 5 (five) minutes as needed. 30 tablet 11    pantoprazole (PROTONIX) 20 MG tablet Take 1 tablet (20 mg total) by mouth once daily. (Patient taking differently: Take 20 mg by mouth as needed. ) 90 tablet 3    betamethasone dipropionate (DIPROLENE) 0.05 % ointment Apply topically 3 (three) times daily. 45 g 2    fenofibrate 160 MG Tab Take 1 tablet (160 mg total) by mouth once daily. 90 tablet 3     No current facility-administered medications for this visit.        Objective:   Right Arm BP - Sittin/67 (18 0906)  Left Arm BP - Sittin/69 (18 0906)  /69 (BP Location: Left arm, Patient Position: Sitting, BP Method: Large (Automatic))   Pulse 81   Ht 5' 5" (1.651 m)   Wt 73.9 kg (162 lb 14.7 oz)   SpO2 98%   BMI 27.11 kg/m²     Body surface area is 1.84 meters squared.  Physical Exam   Constitutional: He is oriented to person, place, and time. Vital signs are normal. He appears well-developed and well-nourished.   /60  Pulse 72  Ht 5' 6" (1.676 m)  Wt 74.9 kg (165 lb 2 " oz)  BMI 26.65 kg/m2   HENT:   Head: Normocephalic and atraumatic.   Mouth/Throat: Mucous membranes are normal. Mucous membranes are not pale, not dry and not cyanotic.   Eyes: Conjunctivae, EOM and lids are normal. Pupils are equal, round, and reactive to light.   Neck: Neck supple. No hepatojugular reflux and no JVD present. Carotid bruit is not present. No tracheal deviation present. No thyromegaly present.   Cardiovascular: Normal rate, regular rhythm, S1 normal, S2 normal and intact distal pulses.  PMI is not displaced.  Exam reveals no gallop and no friction rub.    No murmur heard.  Pulmonary/Chest: Effort normal and breath sounds normal. No respiratory distress. He has no wheezes. He has no rales. He exhibits no tenderness.   No kyphosis or scoliosis.    Abdominal: Soft. Normal appearance and bowel sounds are normal. He exhibits no distension and no mass. There is no hepatomegaly. There is no tenderness.   Musculoskeletal: Normal range of motion. He exhibits no edema or tenderness.        Right upper leg: He exhibits no edema.        Left upper leg: He exhibits no edema.        Right lower leg: He exhibits no edema.        Left lower leg: He exhibits no edema.   No limitations in exercise ability.    Neurological: He is alert and oriented to person, place, and time. He has normal strength.   Skin: Skin is warm, dry and intact. No rash noted. No cyanosis. Nails show no clubbing.   Psychiatric: He has a normal mood and affect. His speech is normal and behavior is normal.       Chemistry        Component Value Date/Time     04/16/2018 0725    K 5.6 (H) 04/16/2018 0725     04/16/2018 0725    CO2 26 04/16/2018 0725    BUN 12 04/16/2018 0725    CREATININE 1.2 04/16/2018 0725     (H) 04/16/2018 0725        Component Value Date/Time    CALCIUM 8.9 04/16/2018 0725    ALKPHOS 138 (H) 03/01/2018 0847    AST 17 03/01/2018 0847    ALT 14 03/01/2018 0847    BILITOT 0.4 03/01/2018 0847               Recent Labs  Lab 03/01/18  0847 04/16/18  0725   WHITE BLOOD CELL COUNT 9.42  --    HEMOGLOBIN 14.2  --    HEMATOCRIT 45.0  --    MCV 86  --    PLATELETS 169  --    CHOLESTEROL 145 143   HDL 28 L 30 L   LDL CHOLESTEROL Invalid, Trig>400.0 38.8 L   TRIGLYCERIDES 633 H 371 H   HDL/CHOLESTEROL RATIO 19.3 L 21.0         Recent Labs  Lab 05/27/15  1250 11/28/15  1850   INR 0.9 0.9          Test(s) Reviewed  I have reviewed the following in detail:  [] Stress test    [] Angiography   [] Echocardiogram   [x] Labs   [] Other:         Assessment/Plan:   Mixed dyslipidemia  Lipids improved with diet modifications.   Lab Results   Component Value Date    CHOL 143 04/16/2018    CHOL 145 03/01/2018      HDL 30 (L) 04/16/2018    HDL 28 (L) 03/01/2018      LDLCALC 38.8 (L) 04/16/2018    LDLCALC Invalid, Trig>400.0 03/01/2018      TRIG 371 (H) 04/16/2018    TRIG 633 (H) 03/01/2018     Triglycerides still elevated -- will restart fenofibrate.   Continue diet and exercise.     Carotid artery disease  20-39% bilaterally in 2012.   Bilateral carotid US on Monday.     Essential hypertension  Well controlled on losartan.     BPH without urinary obstruction  Stable.   On Libby.   Elevated PSA.  Follows with Urology.       Diabetes mellitus, type 2  Last A1C 6.4  Previously on metformin.   Follow up with PCP.       Exercise stress echo and bilateral carotid US on Monday.   Restart fenofibrate 160mg daily.   Continue diet and exercise.    No need for ASA as he has no hx of CAD and does have hx of GIB.     Follow up with Urology RE: elevated PSA.  Follow up with PCP RE: A1C    No Follow-up on file.

## 2018-04-23 ENCOUNTER — CLINICAL SUPPORT (OUTPATIENT)
Dept: CARDIOLOGY | Facility: CLINIC | Age: 73
End: 2018-04-23
Attending: INTERNAL MEDICINE
Payer: MEDICARE

## 2018-04-23 DIAGNOSIS — I77.9 RIGHT-SIDED CAROTID ARTERY DISEASE: ICD-10-CM

## 2018-04-23 DIAGNOSIS — I67.2 CEREBRAL ATHEROSCLEROSIS: ICD-10-CM

## 2018-04-23 LAB — INTERNAL CAROTID STENOSIS: NORMAL

## 2018-04-23 PROCEDURE — 93880 EXTRACRANIAL BILAT STUDY: CPT | Mod: PBBFAC | Performed by: INTERNAL MEDICINE

## 2018-04-28 ENCOUNTER — TELEPHONE (OUTPATIENT)
Dept: INTERNAL MEDICINE | Facility: CLINIC | Age: 73
End: 2018-04-28

## 2018-04-28 DIAGNOSIS — R97.20 PSA ELEVATION: ICD-10-CM

## 2018-04-28 DIAGNOSIS — E87.5 HYPERKALEMIA: Primary | ICD-10-CM

## 2018-04-29 NOTE — TELEPHONE ENCOUNTER
Please call-- 1. Potasium is high-- please have him stop his losartan  And recheck BP in 1 week.      Also his psa is high- has risen-- please refer him to urology   Orders in

## 2018-04-30 ENCOUNTER — PATIENT MESSAGE (OUTPATIENT)
Dept: INTERNAL MEDICINE | Facility: CLINIC | Age: 73
End: 2018-04-30

## 2018-04-30 NOTE — TELEPHONE ENCOUNTER
Spoke to pt and informed. Pt stated that he will get appt set up in Urology. Pt requested that labs are released to his [facility] account.

## 2018-05-06 NOTE — PROGRESS NOTES
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  He comes in today   to follow up his ongoing medical problems.  Back in 2016 on a trip to [facility], he   had a significant diverticulosis bleed and ended up hospitalized with   hemicolectomy.  He says he is doing well.  He has gained back some of the weight   he had lost; lost up to 15 pounds, and gained some of that back.  Dr. Rosenberg   also suffers from diabetes mellitus type 2.  He had some labs done recently   showing hemoglobin A1c had gone up to 6.4.  He is a vegetarian and his wife   tries to get him follow a better diet.  He has a little bit of hyperkalemia at   5.6, this the last time is 5.3.  He is, like I said, a vegetarian and does eat   lot of fruits.  He has mixed dyslipidemia for which he is on fenofibrate.  Most   recent cholesterol shows triglycerides have dropped down to 371, which is   actually way better than back in March and over 600 and now we see his HDL 30   and LDL is 38.  He is not on a statin.  In reviewing his records, it does not   seem he needs to be on a statin.  Next, he has BPH.  He has been put on Libby.    He says he is still having to get up a couple of times to urinate.  Next, he has   carotid artery disease and he is due for recheck of his carotid arteries from   previous ultrasound.  No CVA or  TIA type activity.  Occasional aches and pains   and some rhinorrhea.    PHYSICAL EXAMINATION:  GENERAL:  He is a well-appearing  gentleman.  VITAL SIGNS:  Blood pressure today and vital signs are reviewed.  He is alert   and oriented x4.  NECK:  Supple.  He has no JVD.  Thyroid is not enlarged.  CARDIOVASCULAR:  S1 and S2, regular rate and rhythm without murmur, gallop, or   rub.  ABDOMEN:  Soft, nontender, no hepatosplenomegaly, no guarding or rebound   tenderness.  LOWER EXTREMITIES:  No edema.    ASSESSMENT:  BPH, diabetes, mixed dyslipidemia, right-sided carotid artery   disease.    PLAN:  Labs as he requests, follow up with Urology.  We discussed diet and   exercise.  Also, discussed his potassium, which we will follow, and we will have   him follow up with carotid artery Doppler and I will follow it up after the   visit.          /george [id number] angelito(s)        YVETTE  dd: 05/06/2018 10:42:59 (CDT)  td: 05/06/2018 23:59:47 (CDT)  Doc ID   #[id number]  Job ID #[id number]    CC:

## 2018-07-27 ENCOUNTER — TELEPHONE (OUTPATIENT)
Dept: INTERNAL MEDICINE | Facility: CLINIC | Age: 73
End: 2018-07-27

## 2018-07-27 DIAGNOSIS — E78.2 MIXED DYSLIPIDEMIA: Primary | ICD-10-CM

## 2018-07-27 DIAGNOSIS — E11.9 TYPE 2 DIABETES MELLITUS WITHOUT COMPLICATION, WITHOUT LONG-TERM CURRENT USE OF INSULIN: ICD-10-CM

## 2018-07-27 DIAGNOSIS — N40.0 BPH WITHOUT URINARY OBSTRUCTION: ICD-10-CM

## 2018-07-31 ENCOUNTER — LAB VISIT (OUTPATIENT)
Dept: LAB | Facility: HOSPITAL | Age: 73
End: 2018-07-31
Attending: INTERNAL MEDICINE
Payer: MEDICARE

## 2018-07-31 DIAGNOSIS — N40.0 BPH WITHOUT URINARY OBSTRUCTION: ICD-10-CM

## 2018-07-31 DIAGNOSIS — E11.9 TYPE 2 DIABETES MELLITUS WITHOUT COMPLICATION, WITHOUT LONG-TERM CURRENT USE OF INSULIN: ICD-10-CM

## 2018-07-31 DIAGNOSIS — E78.2 MIXED DYSLIPIDEMIA: ICD-10-CM

## 2018-07-31 LAB
ALBUMIN SERPL BCP-MCNC: 3.6 G/DL
ALP SERPL-CCNC: 109 U/L
ALT SERPL W/O P-5'-P-CCNC: 15 U/L
ANION GAP SERPL CALC-SCNC: 6 MMOL/L
AST SERPL-CCNC: 19 U/L
BASOPHILS # BLD AUTO: 0.09 K/UL
BASOPHILS NFR BLD: 1.1 %
BILIRUB SERPL-MCNC: 0.6 MG/DL
BUN SERPL-MCNC: 11 MG/DL
CALCIUM SERPL-MCNC: 8.9 MG/DL
CHLORIDE SERPL-SCNC: 108 MMOL/L
CHOLEST SERPL-MCNC: 126 MG/DL
CHOLEST/HDLC SERPL: 4.3 {RATIO}
CO2 SERPL-SCNC: 25 MMOL/L
CREAT SERPL-MCNC: 1.1 MG/DL
DIFFERENTIAL METHOD: ABNORMAL
EOSINOPHIL # BLD AUTO: 1 K/UL
EOSINOPHIL NFR BLD: 13.2 %
ERYTHROCYTE [DISTWIDTH] IN BLOOD BY AUTOMATED COUNT: 13.1 %
EST. GFR  (AFRICAN AMERICAN): >60 ML/MIN/1.73 M^2
EST. GFR  (NON AFRICAN AMERICAN): >60 ML/MIN/1.73 M^2
ESTIMATED AVG GLUCOSE: 143 MG/DL
GLUCOSE SERPL-MCNC: 123 MG/DL
HBA1C MFR BLD HPLC: 6.6 %
HCT VFR BLD AUTO: 45.1 %
HDLC SERPL-MCNC: 29 MG/DL
HDLC SERPL: 23 %
HGB BLD-MCNC: 15.1 G/DL
LDLC SERPL CALC-MCNC: 33.6 MG/DL
LYMPHOCYTES # BLD AUTO: 1.7 K/UL
LYMPHOCYTES NFR BLD: 21.8 %
MCH RBC QN AUTO: 29 PG
MCHC RBC AUTO-ENTMCNC: 33.5 G/DL
MCV RBC AUTO: 87 FL
MONOCYTES # BLD AUTO: 0.5 K/UL
MONOCYTES NFR BLD: 6.7 %
NEUTROPHILS # BLD AUTO: 4.5 K/UL
NEUTROPHILS NFR BLD: 57.2 %
NONHDLC SERPL-MCNC: 97 MG/DL
PLATELET # BLD AUTO: 168 K/UL
PMV BLD AUTO: 11.2 FL
POTASSIUM SERPL-SCNC: 5 MMOL/L
PROSTATE SPECIFIC ANTIGEN, TOTAL: 3.5 NG/ML
PROT SERPL-MCNC: 7.1 G/DL
PSA FREE MFR SERPL: 35.43 %
PSA FREE SERPL-MCNC: 1.24 NG/ML
RBC # BLD AUTO: 5.2 M/UL
SODIUM SERPL-SCNC: 139 MMOL/L
TRIGL SERPL-MCNC: 317 MG/DL
URATE SERPL-MCNC: 7.7 MG/DL
WBC # BLD AUTO: 7.86 K/UL

## 2018-07-31 PROCEDURE — 84154 ASSAY OF PSA FREE: CPT

## 2018-07-31 PROCEDURE — 83036 HEMOGLOBIN GLYCOSYLATED A1C: CPT

## 2018-07-31 PROCEDURE — 80053 COMPREHEN METABOLIC PANEL: CPT

## 2018-07-31 PROCEDURE — 85025 COMPLETE CBC W/AUTO DIFF WBC: CPT | Mod: PO

## 2018-07-31 PROCEDURE — 36415 COLL VENOUS BLD VENIPUNCTURE: CPT | Mod: PO

## 2018-07-31 PROCEDURE — 80061 LIPID PANEL: CPT

## 2018-07-31 PROCEDURE — 84550 ASSAY OF BLOOD/URIC ACID: CPT

## 2018-10-09 ENCOUNTER — TELEPHONE (OUTPATIENT)
Dept: INTERNAL MEDICINE | Facility: CLINIC | Age: 73
End: 2018-10-09

## 2018-10-09 RX ORDER — AMLODIPINE BESYLATE 5 MG/1
5 TABLET ORAL DAILY
Qty: 90 TABLET | Refills: 2 | Status: SHIPPED | OUTPATIENT
Start: 2018-10-09 | End: 2019-07-31 | Stop reason: SDUPTHER

## 2018-10-09 NOTE — TELEPHONE ENCOUNTER
He walked in to see me in clinic-- bp has been high-- we took him off losartan due to high K- will start low dose amlodipine.

## 2018-10-10 RX ORDER — PANTOPRAZOLE SODIUM 20 MG/1
TABLET, DELAYED RELEASE ORAL
Qty: 90 TABLET | Refills: 3 | Status: SHIPPED | OUTPATIENT
Start: 2018-10-10 | End: 2019-10-07 | Stop reason: SDUPTHER

## 2018-12-20 ENCOUNTER — PATIENT MESSAGE (OUTPATIENT)
Dept: INTERNAL MEDICINE | Facility: CLINIC | Age: 73
End: 2018-12-20

## 2018-12-21 ENCOUNTER — OFFICE VISIT (OUTPATIENT)
Dept: INTERNAL MEDICINE | Facility: CLINIC | Age: 73
End: 2018-12-21
Payer: MEDICARE

## 2018-12-21 DIAGNOSIS — E11.9 TYPE 2 DIABETES MELLITUS WITHOUT COMPLICATION, WITHOUT LONG-TERM CURRENT USE OF INSULIN: Primary | ICD-10-CM

## 2018-12-21 DIAGNOSIS — R97.20 PSA ELEVATION: ICD-10-CM

## 2018-12-21 DIAGNOSIS — I10 HYPERTENSION, UNSPECIFIED TYPE: ICD-10-CM

## 2018-12-21 DIAGNOSIS — J06.9 URI, ACUTE: ICD-10-CM

## 2018-12-21 PROCEDURE — 99214 OFFICE O/P EST MOD 30 MIN: CPT | Mod: S$PBB,,, | Performed by: INTERNAL MEDICINE

## 2018-12-21 PROCEDURE — 99214 OFFICE O/P EST MOD 30 MIN: CPT | Mod: PBBFAC | Performed by: INTERNAL MEDICINE

## 2018-12-21 PROCEDURE — 99214 PR OFFICE/OUTPT VISIT, EST, LEVL IV, 30-39 MIN: ICD-10-PCS | Mod: S$PBB,,, | Performed by: INTERNAL MEDICINE

## 2018-12-21 PROCEDURE — 99999 PR PBB SHADOW E&M-EST. PATIENT-LVL IV: ICD-10-PCS | Mod: PBBFAC,,, | Performed by: INTERNAL MEDICINE

## 2018-12-21 PROCEDURE — 99999 PR PBB SHADOW E&M-EST. PATIENT-LVL IV: CPT | Mod: PBBFAC,,, | Performed by: INTERNAL MEDICINE

## 2018-12-21 RX ORDER — BENZONATATE 100 MG/1
100 CAPSULE ORAL 3 TIMES DAILY PRN
Qty: 30 CAPSULE | Refills: 3 | Status: SHIPPED | OUTPATIENT
Start: 2018-12-21 | End: 2018-12-31

## 2018-12-21 RX ORDER — HYDROCHLOROTHIAZIDE 25 MG/1
25 TABLET ORAL DAILY PRN
Qty: 30 TABLET | Refills: 11 | Status: SHIPPED | OUTPATIENT
Start: 2018-12-21 | End: 2019-10-20

## 2018-12-21 RX ORDER — METFORMIN HYDROCHLORIDE 500 MG/1
500 TABLET ORAL 2 TIMES DAILY WITH MEALS
Qty: 180 TABLET | Refills: 3 | Status: SHIPPED | OUTPATIENT
Start: 2018-12-21 | End: 2019-03-28

## 2019-01-21 VITALS
DIASTOLIC BLOOD PRESSURE: 80 MMHG | WEIGHT: 167.75 LBS | BODY MASS INDEX: 26.96 KG/M2 | TEMPERATURE: 98 F | SYSTOLIC BLOOD PRESSURE: 130 MMHG | OXYGEN SATURATION: 96 % | HEIGHT: 66 IN | HEART RATE: 94 BPM

## 2019-01-22 NOTE — PROGRESS NOTES
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  Several things were discussed.  First, he complains of a   cough that he has been having off and on that has been bothering him.  He has   tried a little Robitussin or Mucinex and that seemed to help.  The cough is   overall doing better.  He suffers from hypertension.  He is on amlodipine 5.  It   looks like his pressure continues to be elevated.  It was 140/80.  It comes   down to 130, but looking at his records, it looks like it has been up more than   not.  He has diabetes mellitus type 2.  He is on metformin for this.  Most   recent labs show that his hemoglobin A1c is 6.6.  Renal function is   unremarkable.  Fasting glucose was 123.  He has BPH.  Most recent lab shows PSA   has come down to 3.5, down from 4.7.  He does have some obstructive symptoms at   night.  He sees an urologist over at [facility] I believe.  He is on Libby for this   also.    PHYSICAL EXAMINATION:  GENERAL:  He is a well-appearing  gentleman in no acute distress.  NECK:  Supple.  He has no JVD.  Thyroid is not enlarged.  CHEST:  Clear without wheeze.  CARDIOVASCULAR:  S1 and S2.  Regular rate and rhythm without murmur, gallop or   rub.  ABDOMEN:  Soft and nontender.  No hepatosplenomegaly.  No guarding or rebound   tenderness.  LOWER EXTREMITIES:  No edema.    ASSESSMENT AND PLAN:  1.  URI.  Lung exam is fine.  2.  Diabetes mellitus type 2.  Sugar is pretty well controlled.  Continue   current medications.  3.  He has a history of elevated PSA that looks like it is doing better.  He   follows with a doctor at [facility].  He is on his Libby.  4.  Due for some vaccinations with shingles and flu vaccine.  Get flu shot today   and shingles vaccine when it becomes available.  He will follow up with Urology   over at [facility].  If he has any problems before next visit, he is going to give   me a call.      TERESA/IN  dd: 01/21/2019 18:13:19 (CST)   td: 01/22/2019 09:57:09 (CST)  Doc ID   #[id number]  Job ID #[id number]    CC:

## 2019-01-28 ENCOUNTER — TELEPHONE (OUTPATIENT)
Dept: ORTHOPEDICS | Facility: CLINIC | Age: 74
End: 2019-01-28

## 2019-01-28 NOTE — TELEPHONE ENCOUNTER
----- Message from Bertha Rodriguez sent at 1/28/2019 11:40 AM CST -----  Contact: Self/ [phone]  Patient Requesting Sooner Appointment.     Reason for sooner appt.: Patient is having pain in his left foot.   When is the first available appointment? 3/12/19.   Communication Preference: cell phone [phone].

## 2019-01-29 ENCOUNTER — OFFICE VISIT (OUTPATIENT)
Dept: ORTHOPEDICS | Facility: CLINIC | Age: 74
End: 2019-01-29
Payer: MEDICARE

## 2019-01-29 VITALS
HEIGHT: 66 IN | HEART RATE: 99 BPM | WEIGHT: 156.5 LBS | BODY MASS INDEX: 25.15 KG/M2 | DIASTOLIC BLOOD PRESSURE: 81 MMHG | SYSTOLIC BLOOD PRESSURE: 141 MMHG

## 2019-01-29 DIAGNOSIS — M76.62 LEFT ACHILLES TENDINITIS: Primary | ICD-10-CM

## 2019-01-29 PROCEDURE — 99213 OFFICE O/P EST LOW 20 MIN: CPT | Mod: PBBFAC | Performed by: ORTHOPAEDIC SURGERY

## 2019-01-29 PROCEDURE — 99999 PR PBB SHADOW E&M-EST. PATIENT-LVL III: CPT | Mod: PBBFAC,,, | Performed by: ORTHOPAEDIC SURGERY

## 2019-01-29 PROCEDURE — 99999 PR PBB SHADOW E&M-EST. PATIENT-LVL III: ICD-10-PCS | Mod: PBBFAC,,, | Performed by: ORTHOPAEDIC SURGERY

## 2019-01-29 PROCEDURE — 99213 PR OFFICE/OUTPT VISIT, EST, LEVL III, 20-29 MIN: ICD-10-PCS | Mod: S$PBB,,, | Performed by: ORTHOPAEDIC SURGERY

## 2019-01-29 PROCEDURE — 99213 OFFICE O/P EST LOW 20 MIN: CPT | Mod: S$PBB,,, | Performed by: ORTHOPAEDIC SURGERY

## 2019-02-01 NOTE — PROGRESS NOTES
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  He comes in today with a new problem.    He states about five months ago, he was placed on ciprofloxacin and had a bout   of mild Achilles tendinosis, which resolved.  He comes in today because about 10   days ago, he developed some pain at the lateral insertion of his Achilles   tendon without any injury.  He wanted just have it checked to make sure there   was no significant structural damage.  He reports the pain is manageable.  He   does not report any swelling.    PHYSICAL EXAMINATION:  The left Achilles tendon reveals no obvious swelling or   erythema.  He has some mild tenderness on the lateral aspect of the calcaneus   probably related to the lateral insertion of Achilles tendon.  He is   neurovascularly intact.    IMPRESSION:  Mild insertional left Achilles tendinitis.    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  I   reassured him that his Achilles tendon is intact.  There are no signs of any   impending progressive problem.  His pain is minimal and I think this should   resolve with time.  If it gets worse or does not improve in a reasonable amount   of time, we will consider further diagnostic studies.      TERRY/SUPRIYA  dd: 01/31/2019 16:01:44 (CST)  td: 02/01/2019 05:05:08 (CST)  Doc ID   #[id number]  Job ID #[id number]    CC:

## 2019-03-05 ENCOUNTER — PATIENT MESSAGE (OUTPATIENT)
Dept: ORTHOPEDICS | Facility: CLINIC | Age: 74
End: 2019-03-05

## 2019-03-07 ENCOUNTER — HOSPITAL ENCOUNTER (OUTPATIENT)
Dept: RADIOLOGY | Facility: HOSPITAL | Age: 74
Discharge: HOME | End: 2019-03-07
Attending: ORTHOPAEDIC SURGERY
Payer: MEDICARE

## 2019-03-07 ENCOUNTER — OFFICE VISIT (OUTPATIENT)
Dept: ORTHOPEDICS | Facility: CLINIC | Age: 74
End: 2019-03-07
Payer: MEDICARE

## 2019-03-07 DIAGNOSIS — M79.672 PAIN OF LEFT HEEL: ICD-10-CM

## 2019-03-07 DIAGNOSIS — M76.62 LEFT ACHILLES TENDINITIS: ICD-10-CM

## 2019-03-07 DIAGNOSIS — M79.672 PAIN OF LEFT HEEL: Primary | ICD-10-CM

## 2019-03-07 PROCEDURE — 99212 OFFICE O/P EST SF 10 MIN: CPT | Mod: PBBFAC,25 | Performed by: ORTHOPAEDIC SURGERY

## 2019-03-07 PROCEDURE — 99213 PR OFFICE/OUTPT VISIT, EST, LEVL III, 20-29 MIN: ICD-10-PCS | Mod: S$PBB,,, | Performed by: ORTHOPAEDIC SURGERY

## 2019-03-07 PROCEDURE — 73650 X-RAY EXAM OF HEEL: CPT | Mod: TC,LT

## 2019-03-07 PROCEDURE — 73650 X-RAY EXAM OF HEEL: CPT | Mod: 26,LT,, | Performed by: RADIOLOGY

## 2019-03-07 PROCEDURE — 99213 OFFICE O/P EST LOW 20 MIN: CPT | Mod: S$PBB,,, | Performed by: ORTHOPAEDIC SURGERY

## 2019-03-07 PROCEDURE — 73650 XR CALCANEUS 2 VIEW LEFT: ICD-10-PCS | Mod: 26,LT,, | Performed by: RADIOLOGY

## 2019-03-07 PROCEDURE — 99999 PR PBB SHADOW E&M-EST. PATIENT-LVL II: CPT | Mod: PBBFAC,,, | Performed by: ORTHOPAEDIC SURGERY

## 2019-03-07 PROCEDURE — 99999 PR PBB SHADOW E&M-EST. PATIENT-LVL II: ICD-10-PCS | Mod: PBBFAC,,, | Performed by: ORTHOPAEDIC SURGERY

## 2019-03-08 DIAGNOSIS — E11.9 TYPE 2 DIABETES MELLITUS WITHOUT COMPLICATION, WITHOUT LONG-TERM CURRENT USE OF INSULIN: ICD-10-CM

## 2019-03-08 DIAGNOSIS — I10 ESSENTIAL HYPERTENSION: ICD-10-CM

## 2019-03-08 DIAGNOSIS — E78.2 MIXED DYSLIPIDEMIA: Primary | ICD-10-CM

## 2019-03-08 NOTE — PROGRESS NOTES
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  He has HTN, HLP, DM    He has not had his exercise stress echo which was ordered.     Lab Results   Component Value Date    CHOL 126 07/31/2018    CHOL 143 04/16/2018    CHOL 145 03/01/2018     Lab Results   Component Value Date    HDL 29 (L) 07/31/2018    HDL 30 (L) 04/16/2018    HDL 28 (L) 03/01/2018     Lab Results   Component Value Date    LDLCALC 33.6 (L) 07/31/2018    LDLCALC 38.8 (L) 04/16/2018    LDLCALC Invalid, Trig>400.0 03/01/2018     Lab Results   Component Value Date    TRIG 317 (H) 07/31/2018    TRIG 371 (H) 04/16/2018    TRIG 633 (H) 03/01/2018     Lab Results   Component Value Date    CHOLHDL 23.0 07/31/2018    CHOLHDL 21.0 04/16/2018    CHOLHDL 19.3 (L) 03/01/2018     Lab Results   Component Value Date    HGBA1C 6.6 (H) 07/31/2018     Orders Placed This Encounter   Procedures    Lipid panel    HEMOGLOBIN A1C    Comprehensive metabolic panel    Echocardiogram stress test with CFD (Cupid Only)      He needs a follow up appt.

## 2019-03-08 NOTE — PROGRESS NOTES
Malik ROJAS Chet  Returns today for follow-up.  He presented to me about 6 weeks ago with some mild insertional Achilles tendon pain. We elected to keep an eye on it at that point. He reports he recently played some tennis and yesterday had significant pain in the back of his left heel.  He called me yesterday and I told him to come in today. He reports this morning he is doing a bit better.    Examination:  He has tenderness and some prominence in the back of his left heel at the insertion of Achilles tendon. There is no significant erythema and minimal swelling.    X-ray:  I ordered a x-ray today which reveals a small insertional osteophyte and a mild Jaleel deformity of the calcaneus    Impression:  Left insertional Achilles tendinitis    Recommendation:  He is doing significantly better today but because this is continued over the last 6 weeks I would like to obtain an MRI.  If the MRI shows significant intertendinous signal change I might recommend a course of immobilization in a fracture boot followed by physical therapy.  If there is no significant tendinosis I would recommend a course of physical therapy.      Follow-up with results of MRI

## 2019-03-25 DIAGNOSIS — K57.31 DIVERTICULOSIS OF LARGE INTESTINE WITH HEMORRHAGE: Primary | ICD-10-CM

## 2019-03-25 DIAGNOSIS — Z12.5 ENCOUNTER FOR SCREENING FOR MALIGNANT NEOPLASM OF PROSTATE: ICD-10-CM

## 2019-03-27 ENCOUNTER — LAB VISIT (OUTPATIENT)
Dept: LAB | Facility: HOSPITAL | Age: 74
End: 2019-03-27
Attending: INTERNAL MEDICINE
Payer: MEDICARE

## 2019-03-27 DIAGNOSIS — E11.9 TYPE 2 DIABETES MELLITUS WITHOUT COMPLICATION, WITHOUT LONG-TERM CURRENT USE OF INSULIN: ICD-10-CM

## 2019-03-27 DIAGNOSIS — E78.2 MIXED DYSLIPIDEMIA: ICD-10-CM

## 2019-03-27 DIAGNOSIS — K57.31 DIVERTICULOSIS OF LARGE INTESTINE WITH HEMORRHAGE: ICD-10-CM

## 2019-03-27 DIAGNOSIS — I10 ESSENTIAL HYPERTENSION: ICD-10-CM

## 2019-03-27 DIAGNOSIS — Z12.5 ENCOUNTER FOR SCREENING FOR MALIGNANT NEOPLASM OF PROSTATE: ICD-10-CM

## 2019-03-27 LAB
ALBUMIN SERPL BCP-MCNC: 3.9 G/DL (ref 3.5–5.2)
ALP SERPL-CCNC: 123 U/L (ref 55–135)
ALT SERPL W/O P-5'-P-CCNC: 22 U/L (ref 10–44)
ANION GAP SERPL CALC-SCNC: 7 MMOL/L (ref 8–16)
AST SERPL-CCNC: 26 U/L (ref 10–40)
BILIRUB SERPL-MCNC: 0.5 MG/DL (ref 0.1–1)
BNP SERPL-MCNC: <10 PG/ML (ref 0–99)
BUN SERPL-MCNC: 14 MG/DL (ref 8–23)
CALCIUM SERPL-MCNC: 9.9 MG/DL (ref 8.7–10.5)
CHLORIDE SERPL-SCNC: 105 MMOL/L (ref 95–110)
CHOLEST SERPL-MCNC: 159 MG/DL (ref 120–199)
CHOLEST/HDLC SERPL: 5.3 {RATIO} (ref 2–5)
CO2 SERPL-SCNC: 28 MMOL/L (ref 23–29)
COMPLEXED PSA SERPL-MCNC: 2.9 NG/ML (ref 0–4)
CREAT SERPL-MCNC: 1.3 MG/DL (ref 0.5–1.4)
EST. GFR  (AFRICAN AMERICAN): >60 ML/MIN/1.73 M^2
EST. GFR  (NON AFRICAN AMERICAN): 54.5 ML/MIN/1.73 M^2
ESTIMATED AVG GLUCOSE: 169 MG/DL (ref 68–131)
GLUCOSE SERPL-MCNC: 154 MG/DL (ref 70–110)
HBA1C MFR BLD HPLC: 7.5 % (ref 4–5.6)
HDLC SERPL-MCNC: 30 MG/DL (ref 40–75)
HDLC SERPL: 18.9 % (ref 20–50)
LDLC SERPL CALC-MCNC: ABNORMAL MG/DL (ref 63–159)
NONHDLC SERPL-MCNC: 129 MG/DL
POTASSIUM SERPL-SCNC: 5.6 MMOL/L (ref 3.5–5.1)
PROT SERPL-MCNC: 7.9 G/DL (ref 6–8.4)
SODIUM SERPL-SCNC: 140 MMOL/L (ref 136–145)
TRIGL SERPL-MCNC: 669 MG/DL (ref 30–150)

## 2019-03-27 PROCEDURE — 80061 LIPID PANEL: CPT

## 2019-03-27 PROCEDURE — 36415 COLL VENOUS BLD VENIPUNCTURE: CPT | Mod: PO

## 2019-03-27 PROCEDURE — 84153 ASSAY OF PSA TOTAL: CPT

## 2019-03-27 PROCEDURE — 83880 ASSAY OF NATRIURETIC PEPTIDE: CPT

## 2019-03-27 PROCEDURE — 83036 HEMOGLOBIN GLYCOSYLATED A1C: CPT

## 2019-03-27 PROCEDURE — 80053 COMPREHEN METABOLIC PANEL: CPT

## 2019-03-28 ENCOUNTER — OFFICE VISIT (OUTPATIENT)
Dept: ENDOCRINOLOGY | Facility: CLINIC | Age: 74
End: 2019-03-28
Payer: MEDICARE

## 2019-03-28 ENCOUNTER — OFFICE VISIT (OUTPATIENT)
Dept: CARDIOLOGY | Facility: CLINIC | Age: 74
End: 2019-03-28
Payer: MEDICARE

## 2019-03-28 ENCOUNTER — LAB VISIT (OUTPATIENT)
Dept: LAB | Facility: HOSPITAL | Age: 74
End: 2019-03-28
Attending: INTERNAL MEDICINE
Payer: MEDICARE

## 2019-03-28 VITALS
HEART RATE: 84 BPM | HEIGHT: 66 IN | OXYGEN SATURATION: 96 % | BODY MASS INDEX: 26.5 KG/M2 | SYSTOLIC BLOOD PRESSURE: 127 MMHG | WEIGHT: 164.88 LBS | DIASTOLIC BLOOD PRESSURE: 69 MMHG

## 2019-03-28 VITALS
SYSTOLIC BLOOD PRESSURE: 102 MMHG | DIASTOLIC BLOOD PRESSURE: 60 MMHG | WEIGHT: 164.25 LBS | HEART RATE: 88 BPM | BODY MASS INDEX: 26.4 KG/M2 | RESPIRATION RATE: 18 BRPM | HEIGHT: 66 IN

## 2019-03-28 DIAGNOSIS — E11.9 TYPE 2 DIABETES MELLITUS WITHOUT COMPLICATION, WITHOUT LONG-TERM CURRENT USE OF INSULIN: Primary | ICD-10-CM

## 2019-03-28 DIAGNOSIS — E78.2 MIXED DYSLIPIDEMIA: ICD-10-CM

## 2019-03-28 DIAGNOSIS — E55.9 VITAMIN D INSUFFICIENCY: ICD-10-CM

## 2019-03-28 DIAGNOSIS — E11.69 TYPE 2 DIABETES MELLITUS WITH OTHER SPECIFIED COMPLICATION, WITHOUT LONG-TERM CURRENT USE OF INSULIN: Primary | ICD-10-CM

## 2019-03-28 DIAGNOSIS — I10 ESSENTIAL HYPERTENSION: ICD-10-CM

## 2019-03-28 DIAGNOSIS — G43.119 INTRACTABLE MIGRAINE WITH AURA WITHOUT STATUS MIGRAINOSUS: ICD-10-CM

## 2019-03-28 DIAGNOSIS — R07.2 PRECORDIAL PAIN: ICD-10-CM

## 2019-03-28 DIAGNOSIS — N18.30 CKD (CHRONIC KIDNEY DISEASE) STAGE 3, GFR 30-59 ML/MIN: ICD-10-CM

## 2019-03-28 LAB
ANION GAP SERPL CALC-SCNC: 12 MMOL/L (ref 8–16)
BUN SERPL-MCNC: 18 MG/DL (ref 8–23)
CALCIUM SERPL-MCNC: 9.7 MG/DL (ref 8.7–10.5)
CHLORIDE SERPL-SCNC: 104 MMOL/L (ref 95–110)
CO2 SERPL-SCNC: 24 MMOL/L (ref 23–29)
CREAT SERPL-MCNC: 1.5 MG/DL (ref 0.5–1.4)
EST. GFR  (AFRICAN AMERICAN): 53 ML/MIN/1.73 M^2
EST. GFR  (NON AFRICAN AMERICAN): 45.9 ML/MIN/1.73 M^2
GLUCOSE SERPL-MCNC: 188 MG/DL (ref 70–110)
POTASSIUM SERPL-SCNC: 5 MMOL/L (ref 3.5–5.1)
SODIUM SERPL-SCNC: 140 MMOL/L (ref 136–145)

## 2019-03-28 PROCEDURE — 99999 PR PBB SHADOW E&M-EST. PATIENT-LVL IV: CPT | Mod: PBBFAC,,, | Performed by: INTERNAL MEDICINE

## 2019-03-28 PROCEDURE — 36415 COLL VENOUS BLD VENIPUNCTURE: CPT

## 2019-03-28 PROCEDURE — 99214 PR OFFICE/OUTPT VISIT, EST, LEVL IV, 30-39 MIN: ICD-10-PCS | Mod: S$PBB,,, | Performed by: INTERNAL MEDICINE

## 2019-03-28 PROCEDURE — 99999 PR PBB SHADOW E&M-EST. PATIENT-LVL III: CPT | Mod: PBBFAC,,, | Performed by: HOSPITALIST

## 2019-03-28 PROCEDURE — 99999 PR PBB SHADOW E&M-EST. PATIENT-LVL IV: ICD-10-PCS | Mod: PBBFAC,,, | Performed by: INTERNAL MEDICINE

## 2019-03-28 PROCEDURE — 99204 OFFICE O/P NEW MOD 45 MIN: CPT | Mod: S$PBB,,, | Performed by: HOSPITALIST

## 2019-03-28 PROCEDURE — 99214 OFFICE O/P EST MOD 30 MIN: CPT | Mod: S$PBB,,, | Performed by: INTERNAL MEDICINE

## 2019-03-28 PROCEDURE — 99214 OFFICE O/P EST MOD 30 MIN: CPT | Mod: PBBFAC,27 | Performed by: INTERNAL MEDICINE

## 2019-03-28 PROCEDURE — 99999 PR PBB SHADOW E&M-EST. PATIENT-LVL III: ICD-10-PCS | Mod: PBBFAC,,, | Performed by: HOSPITALIST

## 2019-03-28 PROCEDURE — 99204 PR OFFICE/OUTPT VISIT, NEW, LEVL IV, 45-59 MIN: ICD-10-PCS | Mod: S$PBB,,, | Performed by: HOSPITALIST

## 2019-03-28 PROCEDURE — 99213 OFFICE O/P EST LOW 20 MIN: CPT | Mod: PBBFAC | Performed by: HOSPITALIST

## 2019-03-28 PROCEDURE — 80048 BASIC METABOLIC PNL TOTAL CA: CPT

## 2019-03-28 RX ORDER — FENOFIBRATE 160 MG/1
160 TABLET ORAL DAILY
Qty: 90 TABLET | Refills: 3
Start: 2019-03-28 | End: 2019-10-10 | Stop reason: SDUPTHER

## 2019-03-28 RX ORDER — METFORMIN HYDROCHLORIDE 500 MG/1
500 TABLET, EXTENDED RELEASE ORAL 2 TIMES DAILY WITH MEALS
Qty: 180 TABLET | Refills: 3 | Status: SHIPPED | OUTPATIENT
Start: 2019-03-28 | End: 2020-02-28 | Stop reason: SDUPTHER

## 2019-03-28 NOTE — PROGRESS NOTES
I have reviewed and concur with Dr. Yanez's history, physical, assessment, and plan.  I have personally interviewed and examined the patient.    T2DM with recent rise in A1c in setting of lower dose of MFM and decreased physical activity  Change to MFM ER and increase to 500 mg BID with further uptitration pending glucose trend  Increase acitivity  Repeat A1c, MAC in 3 months  Would consider DDP-4 or GLP1 as next agent if needed    Samantha Brumfield MD

## 2019-03-28 NOTE — ASSESSMENT & PLAN NOTE
Most likely secondary to DM. He is seeing Endocrine today. Last urinalysis in 7/2018 which did not show any protein

## 2019-03-28 NOTE — LETTER
March 28, 2019      Ann-Marie Gregg MD  [address]  [facility] [address]           [facility] Endocrinology  [address]  [facility] [address]  Phone: [phone]          Patient: Malik Rosenberg   MR Number: [id number]   YOB: 1945   Date of Visit: 3/28/2019       Dear Dr. Ann-Marie Gregg:    Thank you for referring Malik Rosenberg to me for evaluation. Attached you will find relevant portions of my assessment and plan of care.    If you have questions, please do not hesitate to call me. I look forward to following Malik Rosenberg along with you.    Sincerely,    Ermias Yanez MD    Enclosure  CC:  No Recipients    If you would like to receive this communication electronically, please contact [email or web address] or [phone] to request more information on [facility] [employer] access.    For providers and/or their staff who would like to refer a patient to [facility], please contact us through our one-stop-shop provider referral line, [facility], at [phone].    If you feel you have received this communication in error or would no longer like to receive these types of communications, please e-mail [email or web address]

## 2019-03-28 NOTE — PROGRESS NOTES
"Subjective:      Patient ID: Rajasekharan BOB Rosenberg is a 73 y.o. male.    Chief Complaint:  DM2      History of Present Illness  Diabetes Mellitus Type II, Initial Visit   Longshar is here for an initial evaluation of Type 2 diabetes mellitus. 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   Was on Cipro recently, leading to tendinitis, unable to work out.   He plan to start a diet, cut back on junk food, start exercising in 2 weeks once his L leg is better. He request time to make these changes. Does not want new medications.     Known diabetic complications: cardiovascular disease  Cardiovascular risk factors: advanced age (older than 55 for men, 65 for women), diabetes mellitus, dyslipidemia, hypertension and male gender  Current diabetic medications include: Metformin 500mg BID but currently taking it once daily since 2017    Patient in 2017, had diverticulitis leading to a partial colectomy, he since has improved but at that time had major weight lost. Report that metformin 500mg BID was causing him to get soft stool at that time.     Eye exam current (within one year): unknown  Weight trend: stable  Prior visit with dietician: no  Current diet: in general, an "unhealthy" diet  Current exercise: none    Current monitoring regimen: none  Home blood sugar records: n/a  Any episodes of hypoglycemia? n/a    Is He on ACE inhibitor or angiotensin II receptor blocker?   No, stop due to hyperK per his cardiologist       Review of Systems   Constitutional: Negative for activity change and unexpected weight change.   HENT: Negative for sore throat and voice change.    Eyes: Negative for visual disturbance.   Respiratory: Negative for shortness of breath.  " "  Cardiovascular: Negative for chest pain.   Gastrointestinal: Negative for abdominal pain.   Genitourinary: Negative for urgency.   Musculoskeletal: Positive for arthralgias. Negative for joint swelling, myalgias and neck pain.   Skin: Negative for wound.   Neurological: Negative for headaches.   Psychiatric/Behavioral: Negative for confusion and sleep disturbance.       Objective:   Physical Exam   Constitutional: He is oriented to person, place, and time. He appears well-developed and well-nourished. No distress.   Eyes: Conjunctivae are normal. No scleral icterus.   Neck: Normal range of motion. No tracheal deviation present. No thyromegaly present.   Cardiovascular: Normal rate and normal heart sounds.   Pulmonary/Chest: Effort normal and breath sounds normal.   Abdominal: Soft. There is no tenderness. No hernia.   Musculoskeletal: Normal range of motion. He exhibits no edema or tenderness.   R foot herrera neuroma    Neurological: He is alert and oriented to person, place, and time.   Skin: Skin is warm. No erythema.   Psychiatric: He has a normal mood and affect. His behavior is normal.   Nursing note and vitals reviewed.    /60   Pulse 88   Resp 18   Ht 5' 6" (1.676 m)   Wt 74.5 kg (164 lb 3.9 oz)   BMI 26.51 kg/m²       Lab Review:   Results for BLANCHE PABLO (MRN [id number]) as of 3/28/2019 13:48   Ref. Range 3/27/2019 08:45   Sodium Latest Ref Range: 136 - 145 mmol/L 140   Potassium Latest Ref Range: 3.5 - 5.1 mmol/L 5.6 (H)   Chloride Latest Ref Range: 95 - 110 mmol/L 105   CO2 Latest Ref Range: 23 - 29 mmol/L 28   Anion Gap Latest Ref Range: 8 - 16 mmol/L 7 (L)   BUN, Bld Latest Ref Range: 8 - 23 mg/dL 14   Creatinine Latest Ref Range: 0.5 - 1.4 mg/dL 1.3   eGFR if non African American Latest Ref Range: >60 mL/min/1.73 m^2 54.5 (A)   eGFR if African American Latest Ref Range: >60 mL/min/1.73 m^2 >60.0   Glucose Latest Ref Range: 70 - 110 mg/dL 154 (H)   Calcium Latest Ref Range: 8.7 - " 10.5 mg/dL 9.9   Alkaline Phosphatase Latest Ref Range: 55 - 135 U/L 123   Total Protein Latest Ref Range: 6.0 - 8.4 g/dL 7.9   Albumin Latest Ref Range: 3.5 - 5.2 g/dL 3.9   Total Bilirubin Latest Ref Range: 0.1 - 1.0 mg/dL 0.5   AST Latest Ref Range: 10 - 40 U/L 26   ALT Latest Ref Range: 10 - 44 U/L 22   Triglycerides Latest Ref Range: 30 - 150 mg/dL 669 (H)   Cholesterol Latest Ref Range: 120 - 199 mg/dL 159   HDL Latest Ref Range: 40 - 75 mg/dL 30 (L)   HDL/Chol Ratio Latest Ref Range: 20.0 - 50.0 % 18.9 (L)   LDL Cholesterol Latest Ref Range: 63.0 - 159.0 mg/dL Invalid, Trig>400.0   Non-HDL Cholesterol Latest Units: mg/dL 129   Total Cholesterol/HDL Ratio Latest Ref Range: 2.0 - 5.0  5.3 (H)   BNP Latest Ref Range: 0 - 99 pg/mL <10   Hemoglobin A1C External Latest Ref Range: 4.0 - 5.6 % 7.5 (H)   Estimated Avg Glucose Latest Ref Range: 68 - 131 mg/dL 169 (H)   PSA, SCREEN Latest Ref Range: 0.00 - 4.00 ng/mL 2.9       Assessment:     1. Type 2 diabetes mellitus with other specified complication, without long-term current use of insulin  Hemoglobin A1c    metFORMIN (GLUCOPHAGE-XR) 500 MG 24 hr tablet    Microalbumin/creatinine urine ratio    CANCELED: Microalbumin/creatinine urine ratio   2. Vitamin D insufficiency  Vitamin D    Microalbumin/creatinine urine ratio   3. Essential hypertension     4. Mixed dyslipidemia        Diabetes Mellitus type II, under good control.    Plan:     Type 2 diabetes mellitus with other specified complication, without long-term current use of insulin  - patient request lifestyle modifications as he will start a diet, cut back on junk food, start exercising in 2 weeks once his L leg is better.  - will optimized metformin, start XR version, plan to start 500mg BID   - repeat A1c, Urine p/c ratio in 3 months, will call pt with result  - free glucometer given      Vitamin D insufficiency  - noted on lab work, pt on multivitamin  - repeat Vit D level in 3 mo  -     Vitamin D; Future;  Expected date: 03/28/2019  -     Microalbumin/creatinine urine ratio; Future; Expected date: 03/28/2019    Essential hypertension  - BP controlled per his cardiologist    Mixed dyslipidemia  - control per PCP    RTC in 6 mo  Will call once lab work is done    Discuss with Dr Octaviano Yanez MD  Endocrinology Fellow  3/28/2019 2:04 PM

## 2019-04-15 ENCOUNTER — PATIENT MESSAGE (OUTPATIENT)
Dept: INTERNAL MEDICINE | Facility: CLINIC | Age: 74
End: 2019-04-15

## 2019-04-15 RX ORDER — EPINEPHRINE 0.3 MG/.3ML
1 INJECTION SUBCUTANEOUS ONCE AS NEEDED
Qty: 1 DEVICE | Refills: 0 | Status: SHIPPED | OUTPATIENT
Start: 2019-04-15 | End: 2019-04-22 | Stop reason: SDUPTHER

## 2019-04-15 RX ORDER — ERGOCALCIFEROL 1.25 MG/1
50000 CAPSULE ORAL
Qty: 12 CAPSULE | Refills: 0 | Status: SHIPPED | OUTPATIENT
Start: 2019-04-15 | End: 2019-04-22 | Stop reason: SDUPTHER

## 2019-04-15 RX ORDER — ALBUTEROL SULFATE 90 UG/1
2 AEROSOL, METERED RESPIRATORY (INHALATION) EVERY 6 HOURS PRN
Qty: 1 EACH | Refills: 0 | Status: SHIPPED | OUTPATIENT
Start: 2019-04-15 | End: 2019-04-22 | Stop reason: SDUPTHER

## 2019-04-22 RX ORDER — ERGOCALCIFEROL 1.25 MG/1
50000 CAPSULE ORAL
Qty: 12 CAPSULE | Refills: 0 | Status: SHIPPED | OUTPATIENT
Start: 2019-04-22 | End: 2020-08-27 | Stop reason: SDUPTHER

## 2019-04-22 RX ORDER — EPINEPHRINE 0.3 MG/.3ML
1 INJECTION SUBCUTANEOUS ONCE AS NEEDED
Qty: 1 DEVICE | Refills: 3 | Status: SHIPPED | OUTPATIENT
Start: 2019-04-22 | End: 2020-12-28 | Stop reason: SDUPTHER

## 2019-04-22 RX ORDER — ALBUTEROL SULFATE 90 UG/1
2 AEROSOL, METERED RESPIRATORY (INHALATION) EVERY 6 HOURS PRN
Qty: 1 EACH | Refills: 0 | Status: SHIPPED | OUTPATIENT
Start: 2019-04-22 | End: 2020-12-28 | Stop reason: SDUPTHER

## 2019-04-22 RX ORDER — LANOLIN ALCOHOL/MO/W.PET/CERES
1000 CREAM (GRAM) TOPICAL DAILY
Qty: 90 TABLET | Refills: 3 | COMMUNITY
Start: 2019-04-22 | End: 2020-02-28 | Stop reason: SDUPTHER

## 2019-04-23 RX ORDER — NITROGLYCERIN 0.4 MG/1
TABLET SUBLINGUAL
Qty: 25 TABLET | Refills: 11 | Status: SHIPPED | OUTPATIENT
Start: 2019-04-23 | End: 2019-10-17 | Stop reason: SDUPTHER

## 2019-04-24 ENCOUNTER — HOSPITAL ENCOUNTER (OUTPATIENT)
Dept: RADIOLOGY | Facility: HOSPITAL | Age: 74
Discharge: HOME | End: 2019-04-24
Attending: ORTHOPAEDIC SURGERY
Payer: MEDICARE

## 2019-04-24 DIAGNOSIS — M79.672 PAIN OF LEFT HEEL: ICD-10-CM

## 2019-04-24 DIAGNOSIS — M76.62 LEFT ACHILLES TENDINITIS: ICD-10-CM

## 2019-04-24 PROCEDURE — 73721 MRI ANKLE WITHOUT CONTRAST LEFT: ICD-10-PCS | Mod: 26,LT,, | Performed by: RADIOLOGY

## 2019-04-24 PROCEDURE — 73721 MRI JNT OF LWR EXTRE W/O DYE: CPT | Mod: 26,LT,, | Performed by: RADIOLOGY

## 2019-04-24 PROCEDURE — 73721 MRI JNT OF LWR EXTRE W/O DYE: CPT | Mod: TC,LT

## 2019-05-07 RX ORDER — FENOFIBRATE 160 MG/1
TABLET ORAL
Qty: 90 TABLET | Refills: 0 | Status: SHIPPED | OUTPATIENT
Start: 2019-05-07 | End: 2019-10-08

## 2019-05-28 DIAGNOSIS — M76.62 LEFT ACHILLES TENDINITIS: Primary | ICD-10-CM

## 2019-05-28 RX ORDER — METHYLPREDNISOLONE 4 MG/1
TABLET ORAL
Qty: 1 PACKAGE | Refills: 0 | Status: SHIPPED | OUTPATIENT
Start: 2019-05-28 | End: 2019-10-08

## 2019-06-28 ENCOUNTER — LAB VISIT (OUTPATIENT)
Dept: LAB | Facility: HOSPITAL | Age: 74
End: 2019-06-28
Attending: HOSPITALIST
Payer: COMMERCIAL

## 2019-06-28 ENCOUNTER — TELEPHONE (OUTPATIENT)
Dept: INTERNAL MEDICINE | Facility: CLINIC | Age: 74
End: 2019-06-28

## 2019-06-28 DIAGNOSIS — E11.69 TYPE 2 DIABETES MELLITUS WITH OTHER SPECIFIED COMPLICATION, WITHOUT LONG-TERM CURRENT USE OF INSULIN: ICD-10-CM

## 2019-06-28 DIAGNOSIS — E11.9 TYPE 2 DIABETES MELLITUS WITHOUT COMPLICATION, WITHOUT LONG-TERM CURRENT USE OF INSULIN: ICD-10-CM

## 2019-06-28 DIAGNOSIS — Z12.5 SCREENING PSA (PROSTATE SPECIFIC ANTIGEN): ICD-10-CM

## 2019-06-28 DIAGNOSIS — Z12.5 SCREENING PSA (PROSTATE SPECIFIC ANTIGEN): Primary | ICD-10-CM

## 2019-06-28 DIAGNOSIS — E55.9 VITAMIN D INSUFFICIENCY: ICD-10-CM

## 2019-06-28 DIAGNOSIS — E87.5 HYPERKALEMIA: ICD-10-CM

## 2019-06-28 LAB
25(OH)D3+25(OH)D2 SERPL-MCNC: 16 NG/ML (ref 30–96)
ALBUMIN SERPL BCP-MCNC: 3.6 G/DL (ref 3.5–5.2)
ALP SERPL-CCNC: 115 U/L (ref 55–135)
ALT SERPL W/O P-5'-P-CCNC: 11 U/L (ref 10–44)
ANION GAP SERPL CALC-SCNC: 8 MMOL/L (ref 8–16)
ANION GAP SERPL CALC-SCNC: 8 MMOL/L (ref 8–16)
AST SERPL-CCNC: 18 U/L (ref 10–40)
BILIRUB SERPL-MCNC: 0.5 MG/DL (ref 0.1–1)
BUN SERPL-MCNC: 11 MG/DL (ref 8–23)
BUN SERPL-MCNC: 11 MG/DL (ref 8–23)
CALCIUM SERPL-MCNC: 8.9 MG/DL (ref 8.7–10.5)
CALCIUM SERPL-MCNC: 8.9 MG/DL (ref 8.7–10.5)
CHLORIDE SERPL-SCNC: 110 MMOL/L (ref 95–110)
CHLORIDE SERPL-SCNC: 110 MMOL/L (ref 95–110)
CO2 SERPL-SCNC: 23 MMOL/L (ref 23–29)
CO2 SERPL-SCNC: 23 MMOL/L (ref 23–29)
COMPLEXED PSA SERPL-MCNC: 5.3 NG/ML (ref 0–4)
CREAT SERPL-MCNC: 1.2 MG/DL (ref 0.5–1.4)
CREAT SERPL-MCNC: 1.2 MG/DL (ref 0.5–1.4)
EST. GFR  (AFRICAN AMERICAN): >60 ML/MIN/1.73 M^2
EST. GFR  (AFRICAN AMERICAN): >60 ML/MIN/1.73 M^2
EST. GFR  (NON AFRICAN AMERICAN): 59.6 ML/MIN/1.73 M^2
EST. GFR  (NON AFRICAN AMERICAN): 59.6 ML/MIN/1.73 M^2
ESTIMATED AVG GLUCOSE: 157 MG/DL (ref 68–131)
GLUCOSE SERPL-MCNC: 130 MG/DL (ref 70–110)
GLUCOSE SERPL-MCNC: 130 MG/DL (ref 70–110)
HBA1C MFR BLD HPLC: 7.1 % (ref 4–5.6)
POTASSIUM SERPL-SCNC: 5 MMOL/L (ref 3.5–5.1)
POTASSIUM SERPL-SCNC: 5 MMOL/L (ref 3.5–5.1)
PROT SERPL-MCNC: 7.1 G/DL (ref 6–8.4)
SODIUM SERPL-SCNC: 141 MMOL/L (ref 136–145)
SODIUM SERPL-SCNC: 141 MMOL/L (ref 136–145)

## 2019-06-28 PROCEDURE — 82306 VITAMIN D 25 HYDROXY: CPT

## 2019-06-28 PROCEDURE — 84153 ASSAY OF PSA TOTAL: CPT

## 2019-06-28 PROCEDURE — 83036 HEMOGLOBIN GLYCOSYLATED A1C: CPT

## 2019-06-28 PROCEDURE — 36415 COLL VENOUS BLD VENIPUNCTURE: CPT

## 2019-06-28 PROCEDURE — 80053 COMPREHEN METABOLIC PANEL: CPT

## 2019-07-05 ENCOUNTER — TELEPHONE (OUTPATIENT)
Dept: ENDOCRINOLOGY | Facility: CLINIC | Age: 74
End: 2019-07-05

## 2019-07-05 DIAGNOSIS — E11.69 TYPE 2 DIABETES MELLITUS WITH OTHER SPECIFIED COMPLICATION, WITHOUT LONG-TERM CURRENT USE OF INSULIN: Primary | ICD-10-CM

## 2019-07-05 NOTE — TELEPHONE ENCOUNTER
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 Discussed A1C with was 7.1 improvement from 7.5 in march, 2019. Discuss that we would like to increase it to 1000mg BID eventually, pt does not want to do it at this time as he has been traveling and has/had some dietary indiscretion recently, request lab work to recheck in 3 mo    Discuss Vit D level, he just recently started to take [id number]    Will place for repeat A1C in 3 mo      Ermias Yanez MD   Endocrinology Fellow   7/5/2019 12:31 PM

## 2019-07-23 ENCOUNTER — TELEPHONE (OUTPATIENT)
Dept: INTERNAL MEDICINE | Facility: CLINIC | Age: 74
End: 2019-07-23

## 2019-07-31 RX ORDER — AMLODIPINE BESYLATE 2.5 MG/1
TABLET ORAL
Qty: 90 TABLET | Refills: 0 | Status: SHIPPED | OUTPATIENT
Start: 2019-07-31 | End: 2019-09-16 | Stop reason: SDUPTHER

## 2019-09-06 DIAGNOSIS — R10.9 ACUTE ABDOMINAL PAIN: Primary | ICD-10-CM

## 2019-09-16 RX ORDER — AMLODIPINE BESYLATE 2.5 MG/1
TABLET ORAL
Qty: 90 TABLET | Refills: 3 | Status: SHIPPED | OUTPATIENT
Start: 2019-09-16 | End: 2019-10-20

## 2019-09-16 NOTE — TELEPHONE ENCOUNTER
----- Message from Hailey Kohler sent at 9/16/2019  1:42 PM CDT -----  Contact: patient [phone]  Patient called to speak with nurse or  about his medicine. He will give you details when you call.

## 2019-09-16 NOTE — TELEPHONE ENCOUNTER
----- Message from Hailey Kohler sent at 9/16/2019  1:42 PM CDT -----  Contact: patient [phone]  Patient called to speak with nurse or  about his medicine. He will give you details when you call.

## 2019-10-03 ENCOUNTER — PATIENT MESSAGE (OUTPATIENT)
Dept: ADMINISTRATIVE | Facility: OTHER | Age: 74
End: 2019-10-03

## 2019-10-04 ENCOUNTER — PATIENT MESSAGE (OUTPATIENT)
Dept: ENDOCRINOLOGY | Facility: CLINIC | Age: 74
End: 2019-10-04

## 2019-10-04 ENCOUNTER — LAB VISIT (OUTPATIENT)
Dept: LAB | Facility: HOSPITAL | Age: 74
End: 2019-10-04
Attending: HOSPITALIST
Payer: MEDICARE

## 2019-10-04 DIAGNOSIS — E11.69 TYPE 2 DIABETES MELLITUS WITH OTHER SPECIFIED COMPLICATION, WITHOUT LONG-TERM CURRENT USE OF INSULIN: ICD-10-CM

## 2019-10-04 LAB
ESTIMATED AVG GLUCOSE: 148 MG/DL (ref 68–131)
HBA1C MFR BLD HPLC: 6.8 % (ref 4–5.6)

## 2019-10-04 PROCEDURE — 36415 COLL VENOUS BLD VENIPUNCTURE: CPT

## 2019-10-04 PROCEDURE — 83036 HEMOGLOBIN GLYCOSYLATED A1C: CPT

## 2019-10-07 ENCOUNTER — PATIENT MESSAGE (OUTPATIENT)
Dept: ENDOCRINOLOGY | Facility: CLINIC | Age: 74
End: 2019-10-07

## 2019-10-07 DIAGNOSIS — E11.9 TYPE 2 DIABETES MELLITUS: ICD-10-CM

## 2019-10-08 ENCOUNTER — PATIENT MESSAGE (OUTPATIENT)
Dept: INTERNAL MEDICINE | Facility: CLINIC | Age: 74
End: 2019-10-08

## 2019-10-08 RX ORDER — PANTOPRAZOLE SODIUM 20 MG/1
TABLET, DELAYED RELEASE ORAL
Qty: 90 TABLET | Refills: 3 | Status: SHIPPED | OUTPATIENT
Start: 2019-10-08 | End: 2019-10-10

## 2019-10-10 ENCOUNTER — OFFICE VISIT (OUTPATIENT)
Dept: INTERNAL MEDICINE | Facility: CLINIC | Age: 74
End: 2019-10-10
Payer: COMMERCIAL

## 2019-10-10 VITALS
DIASTOLIC BLOOD PRESSURE: 60 MMHG | SYSTOLIC BLOOD PRESSURE: 100 MMHG | OXYGEN SATURATION: 99 % | HEIGHT: 65 IN | BODY MASS INDEX: 27.29 KG/M2 | WEIGHT: 163.81 LBS | HEART RATE: 108 BPM

## 2019-10-10 DIAGNOSIS — N17.9 AKI (ACUTE KIDNEY INJURY): ICD-10-CM

## 2019-10-10 DIAGNOSIS — E78.2 MIXED DYSLIPIDEMIA: ICD-10-CM

## 2019-10-10 DIAGNOSIS — E11.69 TYPE 2 DIABETES MELLITUS WITH OTHER SPECIFIED COMPLICATION, WITHOUT LONG-TERM CURRENT USE OF INSULIN: ICD-10-CM

## 2019-10-10 DIAGNOSIS — N40.0 BPH WITHOUT URINARY OBSTRUCTION: ICD-10-CM

## 2019-10-10 DIAGNOSIS — E55.9 VITAMIN D DEFICIENCY DISEASE: ICD-10-CM

## 2019-10-10 DIAGNOSIS — J45.909 CHRONIC ASTHMA WITHOUT COMPLICATION, UNSPECIFIED ASTHMA SEVERITY, UNSPECIFIED WHETHER PERSISTENT: Primary | ICD-10-CM

## 2019-10-10 PROCEDURE — 99214 OFFICE O/P EST MOD 30 MIN: CPT | Mod: PBBFAC | Performed by: INTERNAL MEDICINE

## 2019-10-10 PROCEDURE — 99214 PR OFFICE/OUTPT VISIT, EST, LEVL IV, 30-39 MIN: ICD-10-PCS | Mod: S$PBB,,, | Performed by: INTERNAL MEDICINE

## 2019-10-10 PROCEDURE — 99214 OFFICE O/P EST MOD 30 MIN: CPT | Mod: S$PBB,,, | Performed by: INTERNAL MEDICINE

## 2019-10-10 PROCEDURE — 99999 PR PBB SHADOW E&M-EST. PATIENT-LVL IV: CPT | Mod: PBBFAC,,, | Performed by: INTERNAL MEDICINE

## 2019-10-10 PROCEDURE — 99999 PR PBB SHADOW E&M-EST. PATIENT-LVL IV: ICD-10-PCS | Mod: PBBFAC,,, | Performed by: INTERNAL MEDICINE

## 2019-10-10 RX ORDER — PANTOPRAZOLE SODIUM 20 MG/1
20 TABLET, DELAYED RELEASE ORAL DAILY
Qty: 90 TABLET | Refills: 3 | Status: SHIPPED | OUTPATIENT
Start: 2019-10-10 | End: 2020-02-28 | Stop reason: SDUPTHER

## 2019-10-10 RX ORDER — FENOFIBRATE 160 MG/1
160 TABLET ORAL DAILY
Qty: 90 TABLET | Refills: 3
Start: 2019-10-10 | End: 2019-10-17 | Stop reason: SDUPTHER

## 2019-10-10 NOTE — PROGRESS NOTES
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  Several things were discussed.      He has diabetes mellitus type 2. For about 4 years.    He is on metformin for this.  Most   recent labs show that his hemoglobin A1c is 6.8.  Renal function is   unremarkable.  Fasting glucose was 123.    He has asthma.  He has an albuterol inhaler but does not use it at all.  No coughing or SOB.        He suffers from hypertension.  He is on amlodipine 2.5.  It   looks like his pressure continues to be elevated.  It was 100/60.  It comes   down to 130 at home.            He has BPH.  Most recent lab shows PSA    was 5.3-- he goes to Urology at [facility]-- He is on tamilosin.  He was also hospitalized recently (Sept 2019) at [facility] for a   renal stone-- He does have some obstructive symptoms at   night.       HE brings in some labs and a ct of the abdomen- renal stone study-  These were reviewed.   MAIK in Sept during his ed visit-- 1.6 creatine-- it was 1.2 here in June--- Sept is when he was having the renal stone.           PHYSICAL EXAMINATION: /60 (BP Location: Right arm, Patient Position: Sitting, BP Method: Large (Manual))   Pulse 108   Ht 5' 5" (1.651 m)   Wt 74.3 kg (163 lb 12.8 oz)   SpO2 99%   BMI 27.26 kg/m²     GENERAL:  He is a well-appearing Palestinian gentleman in no acute distress.  NECK:  Supple.  He has no JVD.  Thyroid is not enlarged.  CHEST:  Clear without wheeze.  CARDIOVASCULAR:  S1 and S2.  Regular rate and rhythm without murmur, gallop or   rub.  ABDOMEN:  Soft and nontender.  No hepatosplenomegaly.  No guarding or rebound   tenderness.  LOWER EXTREMITIES:  No edema.    Protective Sensation (w/ 10 gram monofilament):  Right: Intact  Left: Intact    Visual Inspection:  Normal -  Bilateral    Pedal Pulses:   Right: Present  Left: Present    Posterior tibialis:   Right:Present  Left: Present       ASSESSMENT AND PLAN:  1.  Asthma-   Lung exam is fine.- wants to see pulmonary- will " put consult in .  2.  Diabetes mellitus type 2.  Sugar is pretty well controlled.  Continue   current medications.  3.  He has a history of elevated PSA that looks like it is doing better.  He   follows with a doctor at [facility].  He is on his Libby.  4.  Due for some vaccinations with shingles   5. MAIK- in Sept - will recheck BMP       He will follow up with Urology   over at [facility].  If he has any problems before next visit, he is going to give   me a call.       shingrix vaccine   Answers for HPI/ROS submitted by the patient on 10/5/2019   activity change: No  unexpected weight change: No  neck pain: No  hearing loss: No  rhinorrhea: No  trouble swallowing: No  eye discharge: No  visual disturbance: No  chest tightness: No  wheezing: No  chest pain: Yes  palpitations: No  blood in stool: No  constipation: No  vomiting: No  diarrhea: Yes  polydipsia: No  polyuria: No  difficulty urinating: No  urgency: Yes  hematuria: Yes  joint swelling: No  arthralgias: No  headaches: Yes  weakness: No  confusion: No  dysphoric mood: No

## 2019-10-16 ENCOUNTER — LAB VISIT (OUTPATIENT)
Dept: LAB | Facility: HOSPITAL | Age: 74
End: 2019-10-16
Attending: INTERNAL MEDICINE
Payer: MEDICARE

## 2019-10-16 DIAGNOSIS — E11.69 TYPE 2 DIABETES MELLITUS WITH OTHER SPECIFIED COMPLICATION, WITHOUT LONG-TERM CURRENT USE OF INSULIN: ICD-10-CM

## 2019-10-16 DIAGNOSIS — E55.9 VITAMIN D DEFICIENCY DISEASE: ICD-10-CM

## 2019-10-16 DIAGNOSIS — E78.2 MIXED DYSLIPIDEMIA: ICD-10-CM

## 2019-10-16 LAB
25(OH)D3+25(OH)D2 SERPL-MCNC: 17 NG/ML (ref 30–96)
ALBUMIN SERPL BCP-MCNC: 3.7 G/DL (ref 3.5–5.2)
ALP SERPL-CCNC: 112 U/L (ref 55–135)
ALT SERPL W/O P-5'-P-CCNC: 11 U/L (ref 10–44)
ANION GAP SERPL CALC-SCNC: 8 MMOL/L (ref 8–16)
ANION GAP SERPL CALC-SCNC: 8 MMOL/L (ref 8–16)
AST SERPL-CCNC: 17 U/L (ref 10–40)
BILIRUB SERPL-MCNC: 0.4 MG/DL (ref 0.1–1)
BUN SERPL-MCNC: 12 MG/DL (ref 8–23)
BUN SERPL-MCNC: 12 MG/DL (ref 8–23)
CALCIUM SERPL-MCNC: 9.2 MG/DL (ref 8.7–10.5)
CALCIUM SERPL-MCNC: 9.2 MG/DL (ref 8.7–10.5)
CHLORIDE SERPL-SCNC: 107 MMOL/L (ref 95–110)
CHLORIDE SERPL-SCNC: 107 MMOL/L (ref 95–110)
CHOLEST SERPL-MCNC: 140 MG/DL (ref 120–199)
CHOLEST/HDLC SERPL: 5.2 {RATIO} (ref 2–5)
CO2 SERPL-SCNC: 26 MMOL/L (ref 23–29)
CO2 SERPL-SCNC: 26 MMOL/L (ref 23–29)
CREAT SERPL-MCNC: 1.3 MG/DL (ref 0.5–1.4)
CREAT SERPL-MCNC: 1.3 MG/DL (ref 0.5–1.4)
EST. GFR  (AFRICAN AMERICAN): >60 ML/MIN/1.73 M^2
EST. GFR  (AFRICAN AMERICAN): >60 ML/MIN/1.73 M^2
EST. GFR  (NON AFRICAN AMERICAN): 54.1 ML/MIN/1.73 M^2
EST. GFR  (NON AFRICAN AMERICAN): 54.1 ML/MIN/1.73 M^2
ESTIMATED AVG GLUCOSE: 151 MG/DL (ref 68–131)
GLUCOSE SERPL-MCNC: 135 MG/DL (ref 70–110)
GLUCOSE SERPL-MCNC: 135 MG/DL (ref 70–110)
HBA1C MFR BLD HPLC: 6.9 % (ref 4–5.6)
HDLC SERPL-MCNC: 27 MG/DL (ref 40–75)
HDLC SERPL: 19.3 % (ref 20–50)
LDLC SERPL CALC-MCNC: ABNORMAL MG/DL (ref 63–159)
NONHDLC SERPL-MCNC: 113 MG/DL
POTASSIUM SERPL-SCNC: 5.9 MMOL/L (ref 3.5–5.1)
POTASSIUM SERPL-SCNC: 5.9 MMOL/L (ref 3.5–5.1)
PROT SERPL-MCNC: 7.3 G/DL (ref 6–8.4)
SODIUM SERPL-SCNC: 141 MMOL/L (ref 136–145)
SODIUM SERPL-SCNC: 141 MMOL/L (ref 136–145)
TRIGL SERPL-MCNC: 417 MG/DL (ref 30–150)

## 2019-10-16 PROCEDURE — 80053 COMPREHEN METABOLIC PANEL: CPT

## 2019-10-16 PROCEDURE — 36415 COLL VENOUS BLD VENIPUNCTURE: CPT

## 2019-10-16 PROCEDURE — 83036 HEMOGLOBIN GLYCOSYLATED A1C: CPT

## 2019-10-16 PROCEDURE — 82306 VITAMIN D 25 HYDROXY: CPT

## 2019-10-16 PROCEDURE — 80061 LIPID PANEL: CPT

## 2019-10-17 ENCOUNTER — PATIENT MESSAGE (OUTPATIENT)
Dept: CARDIOLOGY | Facility: CLINIC | Age: 74
End: 2019-10-17

## 2019-10-17 ENCOUNTER — OFFICE VISIT (OUTPATIENT)
Dept: CARDIOLOGY | Facility: CLINIC | Age: 74
End: 2019-10-17
Payer: COMMERCIAL

## 2019-10-17 ENCOUNTER — PATIENT MESSAGE (OUTPATIENT)
Dept: INTERNAL MEDICINE | Facility: CLINIC | Age: 74
End: 2019-10-17

## 2019-10-17 VITALS
OXYGEN SATURATION: 96 % | HEIGHT: 65 IN | HEART RATE: 82 BPM | WEIGHT: 160.94 LBS | SYSTOLIC BLOOD PRESSURE: 124 MMHG | BODY MASS INDEX: 26.81 KG/M2 | DIASTOLIC BLOOD PRESSURE: 69 MMHG

## 2019-10-17 DIAGNOSIS — N18.30 CKD (CHRONIC KIDNEY DISEASE) STAGE 3, GFR 30-59 ML/MIN: Primary | ICD-10-CM

## 2019-10-17 DIAGNOSIS — R06.09 DYSPNEA ON EXERTION: ICD-10-CM

## 2019-10-17 DIAGNOSIS — I10 ESSENTIAL HYPERTENSION: Primary | ICD-10-CM

## 2019-10-17 DIAGNOSIS — E78.2 MIXED DYSLIPIDEMIA: ICD-10-CM

## 2019-10-17 DIAGNOSIS — E11.69 TYPE 2 DIABETES MELLITUS WITH OTHER SPECIFIED COMPLICATION, WITHOUT LONG-TERM CURRENT USE OF INSULIN: ICD-10-CM

## 2019-10-17 DIAGNOSIS — E87.5 HYPERKALEMIA: ICD-10-CM

## 2019-10-17 DIAGNOSIS — I10 ESSENTIAL HYPERTENSION: ICD-10-CM

## 2019-10-17 PROCEDURE — 99999 PR PBB SHADOW E&M-EST. PATIENT-LVL III: CPT | Mod: PBBFAC,,, | Performed by: INTERNAL MEDICINE

## 2019-10-17 PROCEDURE — 3044F PR MOST RECENT HEMOGLOBIN A1C LEVEL <7.0%: ICD-10-PCS | Mod: CPTII,S$GLB,, | Performed by: INTERNAL MEDICINE

## 2019-10-17 PROCEDURE — 99999 PR PBB SHADOW E&M-EST. PATIENT-LVL III: ICD-10-PCS | Mod: PBBFAC,,, | Performed by: INTERNAL MEDICINE

## 2019-10-17 PROCEDURE — 3044F HG A1C LEVEL LT 7.0%: CPT | Mod: CPTII,S$GLB,, | Performed by: INTERNAL MEDICINE

## 2019-10-17 PROCEDURE — 3074F SYST BP LT 130 MM HG: CPT | Mod: CPTII,S$GLB,, | Performed by: INTERNAL MEDICINE

## 2019-10-17 PROCEDURE — 99214 OFFICE O/P EST MOD 30 MIN: CPT | Mod: S$GLB,,, | Performed by: INTERNAL MEDICINE

## 2019-10-17 PROCEDURE — 3074F PR MOST RECENT SYSTOLIC BLOOD PRESSURE < 130 MM HG: ICD-10-PCS | Mod: CPTII,S$GLB,, | Performed by: INTERNAL MEDICINE

## 2019-10-17 PROCEDURE — 3078F DIAST BP <80 MM HG: CPT | Mod: CPTII,S$GLB,, | Performed by: INTERNAL MEDICINE

## 2019-10-17 PROCEDURE — 3078F PR MOST RECENT DIASTOLIC BLOOD PRESSURE < 80 MM HG: ICD-10-PCS | Mod: CPTII,S$GLB,, | Performed by: INTERNAL MEDICINE

## 2019-10-17 PROCEDURE — 99214 PR OFFICE/OUTPT VISIT, EST, LEVL IV, 30-39 MIN: ICD-10-PCS | Mod: S$GLB,,, | Performed by: INTERNAL MEDICINE

## 2019-10-17 RX ORDER — NITROGLYCERIN 0.4 MG/1
0.4 TABLET SUBLINGUAL EVERY 5 MIN PRN
Qty: 25 TABLET | Refills: 11 | Status: SHIPPED | OUTPATIENT
Start: 2019-10-17 | End: 2020-12-28 | Stop reason: SDUPTHER

## 2019-10-17 RX ORDER — FENOFIBRATE 160 MG/1
160 TABLET ORAL DAILY
Qty: 90 TABLET | Refills: 3
Start: 2019-10-17 | End: 2020-12-28 | Stop reason: SDUPTHER

## 2019-10-17 NOTE — ASSESSMENT & PLAN NOTE
His triglycerides are in the 400s today.  We are going to restart fenofibrate he is to continue this.

## 2019-10-17 NOTE — PROGRESS NOTES
Subjective:   Patient ID:  Rajasekhyu Rosenberg is a 74 y.o. male who presents for evaluation of Adult congenital heart disease      HPIPatient is seen in our Adult Congenital Heart Defect Clinic.    Overall he has been doing okay.  He has traveled to [facility].  He gets shortness of breath with heavy exertion and heat.  His diabetes has been doing better after his evaluation by Endocrine.  He has stopped his fenofibrate.  He states the amlodipine is making him feel much better he has no headaches and his blood pressure has been controlled.        Other Medical Problems  HTN  Diabetic Type II  Hyperlipidemia with increased TG        Review of Systems   Constitution: Negative for weight gain and weight loss.   Eyes: Negative for blurred vision.   Cardiovascular: Negative for chest pain, claudication, cyanosis, dyspnea on exertion, irregular heartbeat, leg swelling, near-syncope, orthopnea, palpitations, paroxysmal nocturnal dyspnea and syncope.   Respiratory: Negative for cough.    Endocrine: Negative for polydipsia and polyuria.   Hematologic/Lymphatic: Does not bruise/bleed easily.   Skin: Negative for color change and dry skin.   Musculoskeletal: Negative for falls, muscle cramps, muscle weakness and myalgias.   Gastrointestinal: Negative for abdominal pain, change in bowel habit, heartburn, hematemesis and hematochezia.   Neurological: Negative for brief paralysis, focal weakness, headaches, light-headedness and paresthesias.   Psychiatric/Behavioral: Negative for altered mental status.   Allergic/Immunologic: Negative for environmental allergies.         Allergies and current medications updated and reviewed:  Review of patient's allergies indicates:   Allergen Reactions    Codeine     Fenofibrate Nausea Only    Lisinopril      Cough    Shellfish containing products Swelling     Current Outpatient Medications   Medication Sig Dispense Refill    albuterol (PROVENTIL/VENTOLIN HFA) 90 mcg/actuation inhaler  "Inhale 2 puffs into the lungs every 6 (six) hours as needed for Wheezing. 1 each 0    amLODIPine (NORVASC) 2.5 MG tablet TAKE 1 TABLET(5 MG) BY MOUTH EVERY DAY 90 tablet 3    cyanocobalamin (VITAMIN B-12) 1000 MCG tablet Take 1 tablet (1,000 mcg total) by mouth once daily. 90 tablet 3    EPINEPHrine (EPIPEN) 0.3 mg/0.3 mL AtIn Inject 0.3 mLs (0.3 mg total) into the muscle once as needed. 1 Device 3    ergocalciferol (ERGOCALCIFEROL) 50,000 unit Cap Take 1 capsule (50,000 Units total) by mouth every 7 days. 12 capsule 0    hydroCHLOROthiazide (HYDRODIURIL) 25 MG tablet Take 1 tablet (25 mg total) by mouth daily as needed (swelling). 30 tablet 11    LUZMARIA 0.5-0.4 mg CM24 TAKE 1 CAPSULE BY MOUTH EVERY DAY 30 capsule 0    metFORMIN (GLUCOPHAGE-XR) 500 MG 24 hr tablet Take 1 tablet (500 mg total) by mouth 2 (two) times daily with meals. 180 tablet 3    pantoprazole (PROTONIX) 20 MG tablet Take 1 tablet (20 mg total) by mouth once daily. 90 tablet 3    fenofibrate 160 MG Tab Take 1 tablet (160 mg total) by mouth once daily. 90 tablet 3    nitroGLYCERIN (NITROSTAT) 0.4 MG SL tablet Place 1 tablet (0.4 mg total) under the tongue every 5 (five) minutes as needed for Chest pain. 25 tablet 11     No current facility-administered medications for this visit.        Objective:   Right Arm BP - Sittin/72 (10/17/19 08)  Left Arm BP - Sittin/69 (10/17/19 08)  /69 (BP Location: Left arm, Patient Position: Sitting, BP Method: Large (Automatic))   Pulse 82   Ht 5' 5" (1.651 m)   Wt 73 kg (160 lb 15 oz)   SpO2 96%   BMI 26.78 kg/m²     Body surface area is 1.83 meters squared.  Physical Exam   Constitutional: He is oriented to person, place, and time. He appears well-developed and well-nourished.   HENT:   Mouth/Throat: Mucous membranes are normal.   Neck: No hepatojugular reflux and no JVD present.   Cardiovascular: Normal rate and regular rhythm.   Pulses:       Radial pulses are 2+ on the right " side, and 2+ on the left side.        Posterior tibial pulses are 2+ on the right side, and 2+ on the left side.   Pulmonary/Chest: Breath sounds normal.   No Scoliosis or Kyphosis   Abdominal: Soft. Normal appearance and bowel sounds are normal. There is no hepatomegaly. There is no tenderness.   Musculoskeletal:        Right lower leg: He exhibits no edema.        Left lower leg: He exhibits no edema.   No limitations for exercise   Neurological: He is alert and oriented to person, place, and time.   Skin: Skin is warm and dry. No cyanosis. Nails show no clubbing.   Psychiatric: He has a normal mood and affect. His speech is normal and behavior is normal. Judgment and thought content normal. Cognition and memory are normal.       Chemistry        Component Value Date/Time     10/16/2019 0806     10/16/2019 0806    K 5.9 (H) 10/16/2019 0806    K 5.9 (H) 10/16/2019 0806     10/16/2019 0806     10/16/2019 0806    CO2 26 10/16/2019 0806    CO2 26 10/16/2019 0806    BUN 12 10/16/2019 0806    BUN 12 10/16/2019 0806    CREATININE 1.3 10/16/2019 0806    CREATININE 1.3 10/16/2019 0806     (H) 10/16/2019 0806     (H) 10/16/2019 0806        Component Value Date/Time    CALCIUM 9.2 10/16/2019 0806    CALCIUM 9.2 10/16/2019 0806    ALKPHOS 112 10/16/2019 0806    AST 17 10/16/2019 0806    ALT 11 10/16/2019 0806    BILITOT 0.4 10/16/2019 0806    ESTGFRAFRICA >60.0 10/16/2019 0806    ESTGFRAFRICA >60.0 10/16/2019 0806    EGFRNONAA 54.1 (A) 10/16/2019 0806    EGFRNONAA 54.1 (A) 10/16/2019 0806            Recent Labs   Lab 07/31/18  0907 03/27/19  0845 10/16/19  0806   WBC 7.86  --   --    Hemoglobin 15.1  --   --    Hematocrit 45.1  --   --    Mean Corpuscular Volume 87  --   --    Platelets 168  --   --    BNP  --  <10  --    Cholesterol 126 159 140   HDL 29 L 30 L 27 L   LDL Cholesterol 33.6 L Invalid, Trig>400.0 Invalid, Trig>400.0   Triglycerides 317 H 669 H 417 H   Hdl/Cholesterol Ratio  23.0 18.9 L 19.3 L                Test(s) Reviewed  I have reviewed the following in detail:  [x] Stress test- his last stress test in 2012   [] Angiography   [x] Echocardiogram   [x] Labs   [] Other:  Nonspecific st-t wave-he brought me EKGs from [facility].  Since her last visit he has had an MRI of his ankle which showed tendinitis and peritendinitis with edema.    Last carotid Doppler done in April 2018 showed 0-19% bilateral stenosis       Assessment:   Essential hypertension  Well controlled on amlodipine.  Continue amlodipine    Diabetes mellitus, type 2  HGB A1c of 7.  He is being followed by endocrine    Mixed dyslipidemia  His triglycerides are in the 400s today.  We are going to restart fenofibrate he is to continue this.    Dyspnea on exertion  Patient has high-risk features of diabetes, hyperlipidemia with elevated triglycerides, hypertension and age in descent.  He has challenging echo images.  Recommend PET stress for evaluation.     Plan:   Follow up in about 1 year (around 10/17/2020) for lipids, CMP, EKG, .     Restart Fenobrate  PETS stress  Phone review also test results

## 2019-10-17 NOTE — ASSESSMENT & PLAN NOTE
Patient has high-risk features of diabetes, hyperlipidemia with elevated triglycerides, hypertension and age in descent.  He has challenging echo images.  Recommend PET stress for evaluation.

## 2019-10-18 ENCOUNTER — LAB VISIT (OUTPATIENT)
Dept: LAB | Facility: HOSPITAL | Age: 74
End: 2019-10-18
Attending: INTERNAL MEDICINE
Payer: MEDICARE

## 2019-10-18 DIAGNOSIS — N18.30 CKD (CHRONIC KIDNEY DISEASE) STAGE 3, GFR 30-59 ML/MIN: ICD-10-CM

## 2019-10-18 LAB
ANION GAP SERPL CALC-SCNC: 9 MMOL/L (ref 8–16)
BUN SERPL-MCNC: 13 MG/DL (ref 8–23)
CALCIUM SERPL-MCNC: 9.3 MG/DL (ref 8.7–10.5)
CHLORIDE SERPL-SCNC: 107 MMOL/L (ref 95–110)
CO2 SERPL-SCNC: 27 MMOL/L (ref 23–29)
CREAT SERPL-MCNC: 1.5 MG/DL (ref 0.5–1.4)
EST. GFR  (AFRICAN AMERICAN): 52.6 ML/MIN/1.73 M^2
EST. GFR  (NON AFRICAN AMERICAN): 45.5 ML/MIN/1.73 M^2
GLUCOSE SERPL-MCNC: 197 MG/DL (ref 70–110)
POTASSIUM SERPL-SCNC: 5.2 MMOL/L (ref 3.5–5.1)
SODIUM SERPL-SCNC: 143 MMOL/L (ref 136–145)

## 2019-10-18 PROCEDURE — 80048 BASIC METABOLIC PNL TOTAL CA: CPT

## 2019-10-18 PROCEDURE — 36415 COLL VENOUS BLD VENIPUNCTURE: CPT

## 2019-10-20 ENCOUNTER — TELEPHONE (OUTPATIENT)
Dept: ALLERGY | Facility: CLINIC | Age: 74
End: 2019-10-20

## 2019-10-20 RX ORDER — AMLODIPINE BESYLATE 5 MG/1
TABLET ORAL
Qty: 90 TABLET | Refills: 2 | Status: SHIPPED | OUTPATIENT
Start: 2019-10-20 | End: 2020-02-28 | Stop reason: SDUPTHER

## 2019-10-21 ENCOUNTER — TELEPHONE (OUTPATIENT)
Dept: PULMONOLOGY | Facility: CLINIC | Age: 74
End: 2019-10-21

## 2019-10-21 DIAGNOSIS — J45.909 ASTHMA, UNSPECIFIED ASTHMA SEVERITY, UNSPECIFIED WHETHER COMPLICATED, UNSPECIFIED WHETHER PERSISTENT: Primary | ICD-10-CM

## 2019-10-21 NOTE — TELEPHONE ENCOUNTER
Will stop the hctz and double his amlodipine to 5 mg a a day  Please get a bmp in 2 weeks.  Orders are in

## 2019-10-21 NOTE — TELEPHONE ENCOUNTER
Let's stop the hctz and and increase the amlodipine to 5 mg a day.  ALso , are you taking your vitamin  D -- it was still low.  I am sorry to end this to you this way but the encounter closed and I could not figure out how to send this otherwise.  WIll check bmp in couple of weeks

## 2019-10-24 ENCOUNTER — PATIENT MESSAGE (OUTPATIENT)
Dept: INTERNAL MEDICINE | Facility: CLINIC | Age: 74
End: 2019-10-24

## 2019-10-25 ENCOUNTER — PATIENT MESSAGE (OUTPATIENT)
Dept: PULMONOLOGY | Facility: CLINIC | Age: 74
End: 2019-10-25

## 2019-10-25 ENCOUNTER — OFFICE VISIT (OUTPATIENT)
Dept: OPHTHALMOLOGY | Facility: CLINIC | Age: 74
End: 2019-10-25
Payer: COMMERCIAL

## 2019-10-25 DIAGNOSIS — H26.9 INCIPIENT CATARACT: Primary | ICD-10-CM

## 2019-10-25 PROCEDURE — 99212 OFFICE O/P EST SF 10 MIN: CPT | Mod: PBBFAC | Performed by: OPHTHALMOLOGY

## 2019-10-25 PROCEDURE — 99999 PR PBB SHADOW E&M-EST. PATIENT-LVL II: CPT | Mod: PBBFAC,,, | Performed by: OPHTHALMOLOGY

## 2019-10-25 PROCEDURE — 92014 COMPRE OPH EXAM EST PT 1/>: CPT | Mod: S$PBB,,, | Performed by: OPHTHALMOLOGY

## 2019-10-25 PROCEDURE — 99999 PR PBB SHADOW E&M-EST. PATIENT-LVL II: ICD-10-PCS | Mod: PBBFAC,,, | Performed by: OPHTHALMOLOGY

## 2019-10-25 PROCEDURE — 92014 PR EYE EXAM, EST PATIENT,COMPREHESV: ICD-10-PCS | Mod: S$PBB,,, | Performed by: OPHTHALMOLOGY

## 2019-10-25 NOTE — PROGRESS NOTES
HPI     73 YO male presents today for a follow-up. He states that he does notice   haziness OS>OD. He states that he thinks this is due to dry eyes. He does   use some [facility] PRN and it seems to help. He states that cataracts are not   ready to come out per his last appt.     Last edited by Yadira Pérez, PCT on 10/25/2019 10:32 AM. (History)            Assessment /Plan     For exam results, see Encounter Report.    Incipient cataract      Incipient cataract: Patient does reports mild visual decline, but not sufficient to affect activities of daily living. I recommend monitoring visual status and follow up when visual symptoms worsen.

## 2019-10-28 ENCOUNTER — TELEPHONE (OUTPATIENT)
Dept: CARDIOLOGY | Facility: CLINIC | Age: 74
End: 2019-10-28

## 2019-10-28 NOTE — TELEPHONE ENCOUNTER
----- Message from Ann-Marie Gregg MD sent at 10/25/2019  4:44 PM CDT -----  I have reviewed the results. K is better at 5.2 from 5.9.     Please open telephone encounter, call patient, document conversation, and close encounter. Close encounter unless further questions for me.

## 2019-11-04 ENCOUNTER — PATIENT MESSAGE (OUTPATIENT)
Dept: PULMONOLOGY | Facility: CLINIC | Age: 74
End: 2019-11-04

## 2019-11-05 ENCOUNTER — PATIENT MESSAGE (OUTPATIENT)
Dept: CARDIOLOGY | Facility: CLINIC | Age: 74
End: 2019-11-05

## 2019-11-05 ENCOUNTER — PATIENT MESSAGE (OUTPATIENT)
Dept: PULMONOLOGY | Facility: CLINIC | Age: 74
End: 2019-11-05

## 2019-11-06 ENCOUNTER — TELEPHONE (OUTPATIENT)
Dept: CARDIOLOGY | Facility: CLINIC | Age: 74
End: 2019-11-06

## 2019-11-11 ENCOUNTER — PATIENT MESSAGE (OUTPATIENT)
Dept: PULMONOLOGY | Facility: CLINIC | Age: 74
End: 2019-11-11

## 2019-11-11 ENCOUNTER — PATIENT MESSAGE (OUTPATIENT)
Dept: ORTHOPEDICS | Facility: CLINIC | Age: 74
End: 2019-11-11

## 2019-11-14 ENCOUNTER — OFFICE VISIT (OUTPATIENT)
Dept: PULMONOLOGY | Facility: CLINIC | Age: 74
End: 2019-11-14
Payer: MEDICARE

## 2019-11-14 ENCOUNTER — HOSPITAL ENCOUNTER (OUTPATIENT)
Dept: PULMONOLOGY | Facility: CLINIC | Age: 74
Discharge: HOME | End: 2019-11-14
Payer: COMMERCIAL

## 2019-11-14 ENCOUNTER — HOSPITAL ENCOUNTER (OUTPATIENT)
Dept: PULMONOLOGY | Facility: CLINIC | Age: 74
Discharge: HOME | End: 2019-11-14
Payer: MEDICARE

## 2019-11-14 VITALS
SYSTOLIC BLOOD PRESSURE: 128 MMHG | HEART RATE: 85 BPM | DIASTOLIC BLOOD PRESSURE: 72 MMHG | HEIGHT: 64 IN | OXYGEN SATURATION: 98 % | WEIGHT: 161 LBS | BODY MASS INDEX: 27.49 KG/M2

## 2019-11-14 DIAGNOSIS — J45.909 ASTHMA, UNSPECIFIED ASTHMA SEVERITY, UNSPECIFIED WHETHER COMPLICATED, UNSPECIFIED WHETHER PERSISTENT: ICD-10-CM

## 2019-11-14 DIAGNOSIS — J98.6 ELEVATED HEMIDIAPHRAGM: Primary | ICD-10-CM

## 2019-11-14 LAB
DLCO ADJ PRE: 16.87 ML/(MIN*MMHG) (ref 14.75–28.61)
DLCO SINGLE BREATH LLN: 14.75
DLCO SINGLE BREATH PRE REF: 77.8 %
DLCO SINGLE BREATH REF: 21.68
DLCOC SBVA LLN: 2.28
DLCOC SBVA PRE REF: 142 %
DLCOC SBVA REF: 3.65
DLCOC SINGLE BREATH LLN: 14.75
DLCOC SINGLE BREATH PRE REF: 77.8 %
DLCOC SINGLE BREATH REF: 21.68
DLCOCSBVAULN: 5.01
DLCOCSINGLEBREATHULN: 28.61
DLCOSINGLEBREATHULN: 28.61
DLCOVA LLN: 2.28
DLCOVA PRE REF: 142 %
DLCOVA PRE: 5.18 ML/(MIN*MMHG*L) (ref 2.28–5.01)
DLCOVA REF: 3.65
DLCOVAULN: 5.01
DLVAADJ PRE: 5.18 ML/(MIN*MMHG*L) (ref 2.28–5.01)
FEF 25 75 LLN: 0.77
FEF 25 75 PRE REF: 80.5 %
FEF 25 75 REF: 1.87
FET100 CHG: 3.4 %
FEV05 LLN: 0.91
FEV05 REF: 2.04
FEV1 CHG: 5.3 %
FEV1 FVC LLN: 64
FEV1 FVC PRE REF: 100.8 %
FEV1 FVC REF: 77
FEV1 LLN: 1.68
FEV1 PRE REF: 67.7 %
FEV1 REF: 2.37
FVC CHG: 2.8 %
FVC LLN: 2.3
FVC PRE REF: 67.1 %
FVC REF: 3.07
IVC PRE: 1.95 L (ref 2.3–3.85)
IVC SINGLE BREATH LLN: 2.3
IVC SINGLE BREATH PRE REF: 63.5 %
IVC SINGLE BREATH REF: 3.07
IVCSINGLEBREATHULN: 3.85
MVV LLN: 82
MVV REF: 96
PEF LLN: 5.03
PEF PRE REF: 77.7 %
PEF REF: 7.02
POST FEF 25 75: 1.71 L/S (ref 0.77–2.97)
POST FET 100: 6.14 SEC
POST FEV1 FVC: 79.89 % (ref 64.37–90.32)
POST FEV1: 1.69 L (ref 1.68–3.07)
POST FEV5: 1.44 L (ref 0.91–3.18)
POST FVC: 2.12 L (ref 2.3–3.85)
POST PEF: 4.88 L/S (ref 5.03–9.01)
PRE DLCO: 16.87 ML/(MIN*MMHG) (ref 14.75–28.61)
PRE FEF 25 75: 1.5 L/S (ref 0.77–2.97)
PRE FET 100: 5.94 SEC
PRE FEV05 REF: 65.9 %
PRE FEV1 FVC: 77.97 % (ref 64.37–90.32)
PRE FEV1: 1.61 L (ref 1.68–3.07)
PRE FEV5: 1.35 L (ref 0.91–3.18)
PRE FVC: 2.06 L (ref 2.3–3.85)
PRE PEF: 5.45 L/S (ref 5.03–9.01)
VA PRE: 3.26 L (ref 5.79–5.79)
VA SINGLE BREATH LLN: 5.79
VA SINGLE BREATH PRE REF: 56.3 %
VA SINGLE BREATH REF: 5.79
VASINGLEBREATHULN: 5.79

## 2019-11-14 PROCEDURE — 94060 EVALUATION OF WHEEZING: CPT | Mod: 26,S$PBB,, | Performed by: INTERNAL MEDICINE

## 2019-11-14 PROCEDURE — 99213 OFFICE O/P EST LOW 20 MIN: CPT | Mod: PBBFAC,25 | Performed by: INTERNAL MEDICINE

## 2019-11-14 PROCEDURE — 94729 DIFFUSING CAPACITY: CPT | Mod: PBBFAC | Performed by: INTERNAL MEDICINE

## 2019-11-14 PROCEDURE — 99213 OFFICE O/P EST LOW 20 MIN: CPT | Mod: 25,S$PBB,, | Performed by: INTERNAL MEDICINE

## 2019-11-14 PROCEDURE — 99999 PR PBB SHADOW E&M-EST. PATIENT-LVL III: CPT | Mod: PBBFAC,,, | Performed by: INTERNAL MEDICINE

## 2019-11-14 PROCEDURE — 94060 PR EVAL OF BRONCHOSPASM: ICD-10-PCS | Mod: 26,S$PBB,, | Performed by: INTERNAL MEDICINE

## 2019-11-14 PROCEDURE — 99213 PR OFFICE/OUTPT VISIT, EST, LEVL III, 20-29 MIN: ICD-10-PCS | Mod: 25,S$PBB,, | Performed by: INTERNAL MEDICINE

## 2019-11-14 PROCEDURE — 94060 EVALUATION OF WHEEZING: CPT | Mod: PBBFAC | Performed by: INTERNAL MEDICINE

## 2019-11-14 PROCEDURE — 99999 PR PBB SHADOW E&M-EST. PATIENT-LVL III: ICD-10-PCS | Mod: PBBFAC,,, | Performed by: INTERNAL MEDICINE

## 2019-11-14 PROCEDURE — 94729 DIFFUSING CAPACITY: CPT | Mod: 26,S$PBB,, | Performed by: INTERNAL MEDICINE

## 2019-11-14 PROCEDURE — 94729 PR C02/MEMBANE DIFFUSE CAPACITY: ICD-10-PCS | Mod: 26,S$PBB,, | Performed by: INTERNAL MEDICINE

## 2019-11-15 NOTE — PROGRESS NOTES
Subjective:       Patient ID: Malik Rosenberg is a 74 y.o. male.    Chief Complaint: No chief complaint on file.    HPI   Malik Rosenberg 74 y.o. male    has a past medical history of Asthma, Basal cell carcinoma (2007), Benign prostatic hypertrophy, Carotid artery occlusion, Diabetes mellitus type II, Diverticulosis (1/2016), Hematoma (2016), Hyperlipidemia, Hypertension, Joint pain, and Migraines.    has a past surgical history that includes Colonoscopy (Left, 11/29/2015) and Cholecystectomy.   reports that he has never smoked. He has never used smokeless tobacco. He reports that he does not drink alcohol or use drugs.  Referred by: No ref. provider found  Who had no chief complaint listed for this encounter.  The patient's last visit with me was on 12/9/2014.    Doing well, no issues, exercises, plays tennis, travels. No acute events since last visit.  No worsening sob, littlejohn, no other sig issues  No fever chills, ns, wt changes, nausea, vomiting, diarrhea, constipation, chest pain, tightness, pressure. Remainder of review of systems is negative.  Otherwise asymptomatic from pulmonary standpoint.    Review of Systems   All other systems reviewed and are negative.      Objective:      Physical Exam   Constitutional: He is oriented to person, place, and time. He appears well-developed and well-nourished. He appears not cachectic. No distress.   HENT:   Right Ear: External ear normal.   Left Ear: External ear normal.   Nose: Nose normal.   Pulmonary/Chest: Normal expansion, symmetric chest wall expansion, effort normal and breath sounds normal.   Musculoskeletal: Normal range of motion.   Neurological: He is alert and oriented to person, place, and time. Gait normal.   Skin: Skin is warm and dry. No rash noted. He is not diaphoretic. No cyanosis or erythema. No pallor. Nails show no clubbing.   Psychiatric: He has a normal mood and affect. His behavior is normal. Judgment and thought content normal.    Nursing note and vitals reviewed.    Personal Diagnostic Review    No flowsheet data found.      Assessment:       1. Elevated hemidiaphragm        Outpatient Encounter Medications as of 11/14/2019   Medication Sig Dispense Refill    albuterol (PROVENTIL/VENTOLIN HFA) 90 mcg/actuation inhaler Inhale 2 puffs into the lungs every 6 (six) hours as needed for Wheezing. 1 each 0    amLODIPine (NORVASC) 5 MG tablet TAKE 1 TABLET(5 MG) BY MOUTH EVERY DAY 90 tablet 2    cyanocobalamin (VITAMIN B-12) 1000 MCG tablet Take 1 tablet (1,000 mcg total) by mouth once daily. 90 tablet 3    ergocalciferol (ERGOCALCIFEROL) 50,000 unit Cap Take 1 capsule (50,000 Units total) by mouth every 7 days. 12 capsule 0    fenofibrate 160 MG Tab Take 1 tablet (160 mg total) by mouth once daily. 90 tablet 3    LUZMARIA 0.5-0.4 mg CM24 TAKE 1 CAPSULE BY MOUTH EVERY DAY 30 capsule 0    metFORMIN (GLUCOPHAGE-XR) 500 MG 24 hr tablet Take 1 tablet (500 mg total) by mouth 2 (two) times daily with meals. 180 tablet 3    nitroGLYCERIN (NITROSTAT) 0.4 MG SL tablet Place 1 tablet (0.4 mg total) under the tongue every 5 (five) minutes as needed for Chest pain. 25 tablet 11    pantoprazole (PROTONIX) 20 MG tablet Take 1 tablet (20 mg total) by mouth once daily. 90 tablet 3    EPINEPHrine (EPIPEN) 0.3 mg/0.3 mL AtIn Inject 0.3 mLs (0.3 mg total) into the muscle once as needed. 1 Device 3     No facility-administered encounter medications on file as of 11/14/2019.      No orders of the defined types were placed in this encounter.      Plan:               Assessment:  Diagnoses and all orders for this visit:    Elevated hemidiaphragm  Comments:  Likely the cause of mild restriction that is unchanged over time. Remainder of pfts wnl. nl dlco  no further evaluation.          Plan:  Problem List Items Addressed This Visit     None      Visit Diagnoses     Elevated hemidiaphragm    -  Primary    Likely the cause of mild restriction that is unchanged over  time. Remainder of pfts wnl. nl dlco  no further evaluation.              Follow up if symptoms worsen or fail to improve.    There are no Patient Instructions on file for this visit.    Immunization History   Administered Date(s) Administered    Influenza A (H1N1) 2009 Monovalent - IM 10/21/2009    Pneumococcal Conjugate - 13 Valent 05/26/2016    Pneumococcal Polysaccharide - 23 Valent 01/08/2014    Tdap 01/08/2014        Answers for HPI/ROS submitted by the patient on 11/13/2019   Asthma  In the past 4 weeks, how much of the time did your asthma keep you from getting as much done at work, school, or at home?: none of the time  During the past 4 weeks, how often have you had shortness of breath?: not at all  During the past 4 weeks, how often did your asthma symptoms (Wheezing, coughing, shortness of breath, chest tightness or pain) wake you up at night or earlier that usual in the morning?: not at all  During the past 4 weeks, how often have you used your rescue inhaler or nebulizer medication (such as albuterol)?: not at all  How would you rate your asthma control during the past 4 weeks?: completely controlled   : 25

## 2019-11-18 ENCOUNTER — OFFICE VISIT (OUTPATIENT)
Dept: ORTHOPEDICS | Facility: CLINIC | Age: 74
End: 2019-11-18
Payer: MEDICARE

## 2019-11-18 DIAGNOSIS — M76.62 ACHILLES TENDINITIS OF LEFT LOWER EXTREMITY: Primary | ICD-10-CM

## 2019-11-18 PROCEDURE — 99213 PR OFFICE/OUTPT VISIT, EST, LEVL III, 20-29 MIN: ICD-10-PCS | Mod: S$PBB,,, | Performed by: ORTHOPAEDIC SURGERY

## 2019-11-18 PROCEDURE — 99999 PR PBB SHADOW E&M-EST. PATIENT-LVL II: CPT | Mod: PBBFAC,,, | Performed by: ORTHOPAEDIC SURGERY

## 2019-11-18 PROCEDURE — 99213 OFFICE O/P EST LOW 20 MIN: CPT | Mod: S$PBB,,, | Performed by: ORTHOPAEDIC SURGERY

## 2019-11-18 PROCEDURE — 99999 PR PBB SHADOW E&M-EST. PATIENT-LVL II: ICD-10-PCS | Mod: PBBFAC,,, | Performed by: ORTHOPAEDIC SURGERY

## 2019-11-18 PROCEDURE — 99212 OFFICE O/P EST SF 10 MIN: CPT | Mod: PBBFAC | Performed by: ORTHOPAEDIC SURGERY

## 2019-11-18 RX ORDER — METHYLPREDNISOLONE 4 MG/1
TABLET ORAL
Qty: 1 PACKAGE | Refills: 0 | Status: SHIPPED | OUTPATIENT
Start: 2019-11-18 | End: 2020-12-31

## 2019-11-18 NOTE — PROGRESS NOTES
Malik ROJAS Chet  Returns today for follow-up of his left insertional Achilles tendinitis.  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 The last time I officially saw him was in March 2019 and subsequently he did obtain a fracture boot which he has do used in the past and has helped when needed. He mainly comes in today for a checkup because he is going to be doing a lot of traveling.  He he would also like to start playing some tennis.  He does not report any swelling. He only reports some intermittent lateral heel pain.    Examination:  He walks in with a normal gait. On inspection there is no obvious swelling. There is no tenderness over the proximal Achilles tendon. He has some mild tenderness in the retrocalcaneal bursa and some mild tenderness over the lateral insertion.    Impression:  Left insertional Achilles tendinitis, stable    Recommendation:  I think he is safe to travel.  I prescribed a Medrol Dosepak for him to keep with him in case he needs it. I told him he can start playing some tennis of long is not having any pain while he is playing.    Follow-up as needed

## 2020-02-28 ENCOUNTER — PATIENT MESSAGE (OUTPATIENT)
Dept: INTERNAL MEDICINE | Facility: CLINIC | Age: 75
End: 2020-02-28

## 2020-02-28 DIAGNOSIS — E11.69 TYPE 2 DIABETES MELLITUS WITH OTHER SPECIFIED COMPLICATION, WITHOUT LONG-TERM CURRENT USE OF INSULIN: ICD-10-CM

## 2020-02-28 RX ORDER — METFORMIN HYDROCHLORIDE 500 MG/1
500 TABLET, EXTENDED RELEASE ORAL 2 TIMES DAILY WITH MEALS
Qty: 180 TABLET | Refills: 3 | Status: SHIPPED | OUTPATIENT
Start: 2020-02-28 | End: 2020-12-22 | Stop reason: SDUPTHER

## 2020-02-29 RX ORDER — AMLODIPINE BESYLATE 5 MG/1
TABLET ORAL
Qty: 90 TABLET | Refills: 2 | Status: SHIPPED | OUTPATIENT
Start: 2020-02-29 | End: 2020-08-27 | Stop reason: SDUPTHER

## 2020-02-29 RX ORDER — PANTOPRAZOLE SODIUM 20 MG/1
20 TABLET, DELAYED RELEASE ORAL DAILY
Qty: 90 TABLET | Refills: 3 | Status: SHIPPED | OUTPATIENT
Start: 2020-02-29 | End: 2020-08-27 | Stop reason: SDUPTHER

## 2020-02-29 RX ORDER — LANOLIN ALCOHOL/MO/W.PET/CERES
1000 CREAM (GRAM) TOPICAL DAILY
Qty: 90 TABLET | Refills: 3 | COMMUNITY
Start: 2020-02-29 | End: 2020-08-27 | Stop reason: SDUPTHER

## 2020-03-14 ENCOUNTER — PATIENT MESSAGE (OUTPATIENT)
Dept: INTERNAL MEDICINE | Facility: CLINIC | Age: 75
End: 2020-03-14

## 2020-03-14 DIAGNOSIS — J06.9 URI, ACUTE: Primary | ICD-10-CM

## 2020-03-21 ENCOUNTER — TELEPHONE (OUTPATIENT)
Dept: INFECTIOUS DISEASES | Facility: CLINIC | Age: 75
End: 2020-03-21

## 2020-03-21 DIAGNOSIS — U07.1 COVID-19 VIRUS INFECTION: Primary | ICD-10-CM

## 2020-03-21 RX ORDER — HYDROXYCHLOROQUINE SULFATE 200 MG/1
TABLET, FILM COATED ORAL
Qty: 60 TABLET | Refills: 2 | Status: SHIPPED | OUTPATIENT
Start: 2020-03-21 | End: 2020-03-24 | Stop reason: SDUPTHER

## 2020-03-21 NOTE — TELEPHONE ENCOUNTER
Patient reports exposure to a positive COVID19 case on march 11.  Has been self quarantined since. Has now symptoms.  He is 73 y/o with diabetes and would be high risk.    Plan  1.  A treatment coarse of hydroxychloroquine was sent to his pharmacy.  He will start treatment if symptoms start.

## 2020-03-24 ENCOUNTER — PATIENT MESSAGE (OUTPATIENT)
Dept: INTERNAL MEDICINE | Facility: CLINIC | Age: 75
End: 2020-03-24

## 2020-03-24 DIAGNOSIS — U07.1 COVID-19 VIRUS INFECTION: ICD-10-CM

## 2020-03-24 RX ORDER — HYDROXYCHLOROQUINE SULFATE 200 MG/1
TABLET, FILM COATED ORAL
Qty: 12 TABLET | Refills: 0 | Status: SHIPPED | OUTPATIENT
Start: 2020-03-24 | End: 2020-03-29

## 2020-03-25 ENCOUNTER — CLINICAL SUPPORT (OUTPATIENT)
Dept: INTERNAL MEDICINE | Facility: CLINIC | Age: 75
End: 2020-03-25
Payer: MEDICARE

## 2020-03-25 DIAGNOSIS — R50.81 FEVER IN OTHER DISEASES: Primary | ICD-10-CM

## 2020-03-25 PROCEDURE — U0002 COVID-19 LAB TEST NON-CDC: HCPCS

## 2020-03-27 LAB — SARS-COV-2 RNA RESP QL NAA+PROBE: NOT DETECTED

## 2020-04-21 DIAGNOSIS — Z01.84 ANTIBODY RESPONSE EXAMINATION: ICD-10-CM

## 2020-04-30 ENCOUNTER — PATIENT MESSAGE (OUTPATIENT)
Dept: INTERNAL MEDICINE | Facility: CLINIC | Age: 75
End: 2020-04-30

## 2020-04-30 DIAGNOSIS — R97.20 ELEVATED PSA: ICD-10-CM

## 2020-04-30 DIAGNOSIS — E11.69 TYPE 2 DIABETES MELLITUS WITH OTHER SPECIFIED COMPLICATION, WITHOUT LONG-TERM CURRENT USE OF INSULIN: Primary | ICD-10-CM

## 2020-04-30 DIAGNOSIS — N18.30 CKD (CHRONIC KIDNEY DISEASE) STAGE 3, GFR 30-59 ML/MIN: ICD-10-CM

## 2020-05-04 ENCOUNTER — LAB VISIT (OUTPATIENT)
Dept: LAB | Facility: HOSPITAL | Age: 75
End: 2020-05-04
Attending: INTERNAL MEDICINE
Payer: COMMERCIAL

## 2020-05-04 ENCOUNTER — LAB VISIT (OUTPATIENT)
Dept: LAB | Facility: HOSPITAL | Age: 75
End: 2020-05-04
Attending: INTERNAL MEDICINE
Payer: MEDICARE

## 2020-05-04 ENCOUNTER — PATIENT MESSAGE (OUTPATIENT)
Dept: INTERNAL MEDICINE | Facility: CLINIC | Age: 75
End: 2020-05-04

## 2020-05-04 DIAGNOSIS — N18.30 CKD (CHRONIC KIDNEY DISEASE) STAGE 3, GFR 30-59 ML/MIN: ICD-10-CM

## 2020-05-04 DIAGNOSIS — R97.20 ELEVATED PSA: ICD-10-CM

## 2020-05-04 DIAGNOSIS — E11.69 TYPE 2 DIABETES MELLITUS WITH OTHER SPECIFIED COMPLICATION, WITHOUT LONG-TERM CURRENT USE OF INSULIN: ICD-10-CM

## 2020-05-04 DIAGNOSIS — Z01.84 ANTIBODY RESPONSE EXAMINATION: ICD-10-CM

## 2020-05-04 LAB
ALBUMIN SERPL BCP-MCNC: 3.8 G/DL (ref 3.5–5.2)
ALP SERPL-CCNC: 105 U/L (ref 55–135)
ALT SERPL W/O P-5'-P-CCNC: 17 U/L (ref 10–44)
ANION GAP SERPL CALC-SCNC: 8 MMOL/L (ref 8–16)
AST SERPL-CCNC: 23 U/L (ref 10–40)
BASOPHILS # BLD AUTO: 0.08 K/UL (ref 0–0.2)
BASOPHILS NFR BLD: 0.9 % (ref 0–1.9)
BILIRUB SERPL-MCNC: 0.5 MG/DL (ref 0.1–1)
BUN SERPL-MCNC: 16 MG/DL (ref 8–23)
CALCIUM SERPL-MCNC: 9.1 MG/DL (ref 8.7–10.5)
CHLORIDE SERPL-SCNC: 108 MMOL/L (ref 95–110)
CHOLEST SERPL-MCNC: 126 MG/DL (ref 120–199)
CHOLEST/HDLC SERPL: 3.8 {RATIO} (ref 2–5)
CO2 SERPL-SCNC: 26 MMOL/L (ref 23–29)
CREAT SERPL-MCNC: 1.4 MG/DL (ref 0.5–1.4)
DIFFERENTIAL METHOD: ABNORMAL
EOSINOPHIL # BLD AUTO: 0.9 K/UL (ref 0–0.5)
EOSINOPHIL NFR BLD: 9.8 % (ref 0–8)
ERYTHROCYTE [DISTWIDTH] IN BLOOD BY AUTOMATED COUNT: 14.6 % (ref 11.5–14.5)
ERYTHROCYTE [SEDIMENTATION RATE] IN BLOOD BY WESTERGREN METHOD: 4 MM/HR (ref 0–23)
EST. GFR  (AFRICAN AMERICAN): 56.8 ML/MIN/1.73 M^2
EST. GFR  (NON AFRICAN AMERICAN): 49.1 ML/MIN/1.73 M^2
ESTIMATED AVG GLUCOSE: 137 MG/DL (ref 68–131)
GLUCOSE SERPL-MCNC: 113 MG/DL (ref 70–110)
HBA1C MFR BLD HPLC: 6.4 % (ref 4–5.6)
HCT VFR BLD AUTO: 43 % (ref 40–54)
HDLC SERPL-MCNC: 33 MG/DL (ref 40–75)
HDLC SERPL: 26.2 % (ref 20–50)
HGB BLD-MCNC: 14 G/DL (ref 14–18)
LDLC SERPL CALC-MCNC: 41.8 MG/DL (ref 63–159)
LYMPHOCYTES # BLD AUTO: 2.1 K/UL (ref 1–4.8)
LYMPHOCYTES NFR BLD: 23.6 % (ref 18–48)
MCH RBC QN AUTO: 27.1 PG (ref 27–31)
MCHC RBC AUTO-ENTMCNC: 32.6 G/DL (ref 32–36)
MCV RBC AUTO: 83 FL (ref 82–98)
MONOCYTES # BLD AUTO: 0.7 K/UL (ref 0.3–1)
MONOCYTES NFR BLD: 7.6 % (ref 4–15)
NEUTROPHILS # BLD AUTO: 5.3 K/UL (ref 1.8–7.7)
NEUTROPHILS NFR BLD: 58.1 % (ref 38–73)
NONHDLC SERPL-MCNC: 93 MG/DL
PLATELET # BLD AUTO: 201 K/UL (ref 150–350)
PMV BLD AUTO: 11.9 FL (ref 9.2–12.9)
POTASSIUM SERPL-SCNC: 5.1 MMOL/L (ref 3.5–5.1)
PROSTATE SPECIFIC ANTIGEN, TOTAL: 7.2 NG/ML (ref 0–4)
PROT SERPL-MCNC: 7.3 G/DL (ref 6–8.4)
PSA FREE MFR SERPL: 44.03 %
PSA FREE SERPL-MCNC: 3.17 NG/ML (ref 0.01–1.5)
RBC # BLD AUTO: 5.17 M/UL (ref 4.6–6.2)
SARS-COV-2 IGG SERPLBLD QL IA.RAPID: NEGATIVE
SODIUM SERPL-SCNC: 142 MMOL/L (ref 136–145)
TRIGL SERPL-MCNC: 256 MG/DL (ref 30–150)
URATE SERPL-MCNC: 7.6 MG/DL (ref 3.4–7)
WBC # BLD AUTO: 9.05 K/UL (ref 3.9–12.7)

## 2020-05-04 PROCEDURE — 84154 ASSAY OF PSA FREE: CPT

## 2020-05-04 PROCEDURE — 83036 HEMOGLOBIN GLYCOSYLATED A1C: CPT

## 2020-05-04 PROCEDURE — 86769 SARS-COV-2 COVID-19 ANTIBODY: CPT

## 2020-05-04 PROCEDURE — 36415 COLL VENOUS BLD VENIPUNCTURE: CPT

## 2020-05-04 PROCEDURE — 85025 COMPLETE CBC W/AUTO DIFF WBC: CPT

## 2020-05-04 PROCEDURE — 80053 COMPREHEN METABOLIC PANEL: CPT

## 2020-05-04 PROCEDURE — 85652 RBC SED RATE AUTOMATED: CPT

## 2020-05-04 PROCEDURE — 84550 ASSAY OF BLOOD/URIC ACID: CPT

## 2020-05-04 PROCEDURE — 80061 LIPID PANEL: CPT

## 2020-05-05 ENCOUNTER — TELEPHONE (OUTPATIENT)
Dept: INTERNAL MEDICINE | Facility: CLINIC | Age: 75
End: 2020-05-05

## 2020-05-13 ENCOUNTER — PATIENT MESSAGE (OUTPATIENT)
Dept: ENDOCRINOLOGY | Facility: CLINIC | Age: 75
End: 2020-05-13

## 2020-05-13 DIAGNOSIS — E11.69 TYPE 2 DIABETES MELLITUS WITH OTHER SPECIFIED COMPLICATION, WITHOUT LONG-TERM CURRENT USE OF INSULIN: Primary | ICD-10-CM

## 2020-05-13 RX ORDER — DEXTROSE 4 G
TABLET,CHEWABLE ORAL
Qty: 1 EACH | Refills: 0 | Status: CANCELLED | OUTPATIENT
Start: 2020-05-13

## 2020-05-20 ENCOUNTER — PATIENT MESSAGE (OUTPATIENT)
Dept: CARDIOLOGY | Facility: CLINIC | Age: 75
End: 2020-05-20

## 2020-05-20 ENCOUNTER — PATIENT MESSAGE (OUTPATIENT)
Dept: ENDOCRINOLOGY | Facility: CLINIC | Age: 75
End: 2020-05-20

## 2020-05-29 ENCOUNTER — PATIENT MESSAGE (OUTPATIENT)
Dept: ENDOCRINOLOGY | Facility: CLINIC | Age: 75
End: 2020-05-29

## 2020-07-17 DIAGNOSIS — Z71.89 COMPLEX CARE COORDINATION: ICD-10-CM

## 2020-08-27 DIAGNOSIS — E11.69 TYPE 2 DIABETES MELLITUS WITH OTHER SPECIFIED COMPLICATION, WITHOUT LONG-TERM CURRENT USE OF INSULIN: ICD-10-CM

## 2020-08-27 RX ORDER — METFORMIN HYDROCHLORIDE 500 MG/1
500 TABLET, EXTENDED RELEASE ORAL 2 TIMES DAILY WITH MEALS
Qty: 180 TABLET | Refills: 3 | Status: CANCELLED | OUTPATIENT
Start: 2020-08-27 | End: 2021-08-27

## 2020-08-27 NOTE — TELEPHONE ENCOUNTER
Spoke with patient informed patient that he has not been seen in over 1 yr. Patient states Dr Yanez did not need to see him anymore, his blood sugar was controlled. Told patient he needs to call his PCP to refills Metformin. Patient understands

## 2020-08-28 RX ORDER — PANTOPRAZOLE SODIUM 20 MG/1
20 TABLET, DELAYED RELEASE ORAL DAILY
Qty: 90 TABLET | Refills: 0 | Status: SHIPPED | OUTPATIENT
Start: 2020-08-28 | End: 2021-07-16

## 2020-08-28 RX ORDER — AMLODIPINE BESYLATE 5 MG/1
TABLET ORAL
Qty: 90 TABLET | Refills: 0 | Status: SHIPPED | OUTPATIENT
Start: 2020-08-28 | End: 2021-11-03 | Stop reason: SDUPTHER

## 2020-08-28 RX ORDER — LANOLIN ALCOHOL/MO/W.PET/CERES
1000 CREAM (GRAM) TOPICAL DAILY
Qty: 90 TABLET | Refills: 0 | COMMUNITY
Start: 2020-08-28 | End: 2020-12-22 | Stop reason: SDUPTHER

## 2020-08-28 RX ORDER — ERGOCALCIFEROL 1.25 MG/1
50000 CAPSULE ORAL
Qty: 12 CAPSULE | Refills: 0 | Status: SHIPPED | OUTPATIENT
Start: 2020-08-28 | End: 2020-11-25

## 2020-09-03 DIAGNOSIS — E11.9 TYPE 2 DIABETES MELLITUS WITHOUT COMPLICATION: ICD-10-CM

## 2020-09-29 ENCOUNTER — PATIENT MESSAGE (OUTPATIENT)
Dept: OTHER | Facility: OTHER | Age: 75
End: 2020-09-29

## 2020-10-01 ENCOUNTER — PATIENT OUTREACH (OUTPATIENT)
Dept: ADMINISTRATIVE | Facility: OTHER | Age: 75
End: 2020-10-01

## 2020-10-05 ENCOUNTER — PATIENT MESSAGE (OUTPATIENT)
Dept: ADMINISTRATIVE | Facility: HOSPITAL | Age: 75
End: 2020-10-05

## 2020-12-11 ENCOUNTER — PATIENT MESSAGE (OUTPATIENT)
Dept: OTHER | Facility: OTHER | Age: 75
End: 2020-12-11

## 2020-12-19 ENCOUNTER — IMMUNIZATION (OUTPATIENT)
Dept: INTERNAL MEDICINE | Facility: CLINIC | Age: 75
End: 2020-12-19
Payer: COMMERCIAL

## 2020-12-19 DIAGNOSIS — Z23 NEED FOR VACCINATION: ICD-10-CM

## 2020-12-19 PROCEDURE — 91300 COVID-19, MRNA, LNP-S, PF, 30 MCG/0.3 ML DOSE VACCINE: ICD-10-PCS | Mod: ,,, | Performed by: INTERNAL MEDICINE

## 2020-12-19 PROCEDURE — 0001A COVID-19, MRNA, LNP-S, PF, 30 MCG/0.3 ML DOSE VACCINE: CPT | Mod: CV19,,, | Performed by: INTERNAL MEDICINE

## 2020-12-19 PROCEDURE — 0001A COVID-19, MRNA, LNP-S, PF, 30 MCG/0.3 ML DOSE VACCINE: ICD-10-PCS | Mod: CV19,,, | Performed by: INTERNAL MEDICINE

## 2020-12-19 PROCEDURE — 91300 COVID-19, MRNA, LNP-S, PF, 30 MCG/0.3 ML DOSE VACCINE: CPT | Mod: ,,, | Performed by: INTERNAL MEDICINE

## 2020-12-20 ENCOUNTER — PATIENT MESSAGE (OUTPATIENT)
Dept: INTERNAL MEDICINE | Facility: CLINIC | Age: 75
End: 2020-12-20

## 2020-12-20 ENCOUNTER — PATIENT MESSAGE (OUTPATIENT)
Dept: DERMATOLOGY | Facility: CLINIC | Age: 75
End: 2020-12-20

## 2020-12-20 ENCOUNTER — PATIENT MESSAGE (OUTPATIENT)
Dept: PULMONOLOGY | Facility: CLINIC | Age: 75
End: 2020-12-20

## 2020-12-20 ENCOUNTER — PATIENT MESSAGE (OUTPATIENT)
Dept: OPHTHALMOLOGY | Facility: CLINIC | Age: 75
End: 2020-12-20

## 2020-12-20 DIAGNOSIS — E11.69 TYPE 2 DIABETES MELLITUS WITH OTHER SPECIFIED COMPLICATION, WITHOUT LONG-TERM CURRENT USE OF INSULIN: Primary | ICD-10-CM

## 2020-12-20 DIAGNOSIS — N40.0 BPH WITHOUT URINARY OBSTRUCTION: ICD-10-CM

## 2020-12-20 DIAGNOSIS — E55.9 VITAMIN D DEFICIENCY DISEASE: ICD-10-CM

## 2020-12-21 ENCOUNTER — PATIENT OUTREACH (OUTPATIENT)
Dept: ADMINISTRATIVE | Facility: OTHER | Age: 75
End: 2020-12-21

## 2020-12-21 ENCOUNTER — PATIENT MESSAGE (OUTPATIENT)
Dept: INTERNAL MEDICINE | Facility: CLINIC | Age: 75
End: 2020-12-21

## 2020-12-22 ENCOUNTER — OFFICE VISIT (OUTPATIENT)
Dept: ENDOCRINOLOGY | Facility: CLINIC | Age: 75
End: 2020-12-22
Payer: COMMERCIAL

## 2020-12-22 DIAGNOSIS — E78.2 MIXED DYSLIPIDEMIA: ICD-10-CM

## 2020-12-22 DIAGNOSIS — E55.9 VITAMIN D DEFICIENCY: ICD-10-CM

## 2020-12-22 DIAGNOSIS — N18.31 STAGE 3A CHRONIC KIDNEY DISEASE: ICD-10-CM

## 2020-12-22 DIAGNOSIS — N40.0 BPH WITHOUT URINARY OBSTRUCTION: ICD-10-CM

## 2020-12-22 DIAGNOSIS — E11.69 TYPE 2 DIABETES MELLITUS WITH OTHER SPECIFIED COMPLICATION, WITHOUT LONG-TERM CURRENT USE OF INSULIN: Primary | ICD-10-CM

## 2020-12-22 DIAGNOSIS — E11.9 TYPE 2 DIABETES MELLITUS WITH HEMOGLOBIN A1C GOAL OF LESS THAN 7.0%: ICD-10-CM

## 2020-12-22 PROCEDURE — 99214 OFFICE O/P EST MOD 30 MIN: CPT | Mod: 95,,, | Performed by: HOSPITALIST

## 2020-12-22 PROCEDURE — 99214 PR OFFICE/OUTPT VISIT, EST, LEVL IV, 30-39 MIN: ICD-10-PCS | Mod: 95,,, | Performed by: HOSPITALIST

## 2020-12-22 RX ORDER — LANCING DEVICE
EACH MISCELLANEOUS
Qty: 1 EACH | Refills: 0 | Status: SHIPPED | OUTPATIENT
Start: 2020-12-22 | End: 2021-02-22

## 2020-12-22 RX ORDER — LANCETS
EACH MISCELLANEOUS
Qty: 100 EACH | Refills: 4 | Status: SHIPPED | OUTPATIENT
Start: 2020-12-22 | End: 2021-02-22

## 2020-12-22 RX ORDER — METFORMIN HYDROCHLORIDE 500 MG/1
500 TABLET, EXTENDED RELEASE ORAL 2 TIMES DAILY WITH MEALS
Qty: 180 TABLET | Refills: 3 | Status: SHIPPED | OUTPATIENT
Start: 2020-12-22 | End: 2022-04-25

## 2020-12-22 RX ORDER — LANOLIN ALCOHOL/MO/W.PET/CERES
1000 CREAM (GRAM) TOPICAL DAILY
Qty: 90 TABLET | Refills: 3 | Status: SHIPPED | OUTPATIENT
Start: 2020-12-22

## 2020-12-22 RX ORDER — INSULIN PUMP SYRINGE, 3 ML
EACH MISCELLANEOUS
Qty: 1 EACH | Refills: 0 | Status: SHIPPED | OUTPATIENT
Start: 2020-12-22 | End: 2021-06-03

## 2020-12-22 NOTE — PROGRESS NOTES
Subjective:      Patient ID: Malik Rosenberg is a 75 y.o. male presented to Endocrinology clinic on 12/22/2020.    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  But he is working from home, has been drinking more Coca-Cola at home.  Not as active.  Patient is a vegetarian  When out of testing strips for glucometer    Current diabetes Medications:    Metformin 500 mg XR b.i.d.    Misses medication doses - no    Last Diabetes Education:  None  Hospitalized for diabetes:  Denies    SELF MONITORING BLOOD GLUCOSE:  Has not been checking glucose    Weight trend: stable  Eye exam current (within one year): yes, has appointment next year    Current exercise: walking  Family history of diabetes:  Yes      Diabetes Management Status  Hemoglobin A1C   Date Value Ref Range Status   05/04/2020 6.4 (H) 4.0 - 5.6 % Final     Comment:     ADA Screening Guidelines:  5.7-6.4%  Consistent with prediabetes  >or=6.5%  Consistent with diabetes  High levels of fetal hemoglobin interfere with the HbA1C  assay. Heterozygous hemoglobin variants (HbS, HgC, etc)do  not significantly interfere with this assay.   However, presence of multiple variants may affect accuracy.     10/16/2019 6.9 (H) 4.0 - 5.6 % Final     Comment:     ADA Screening Guidelines:  5.7-6.4%  Consistent with prediabetes  >or=6.5%  Consistent with diabetes  High  levels of fetal hemoglobin interfere with the HbA1C  assay. Heterozygous hemoglobin variants (HbS, HgC, etc)do  not significantly interfere with this assay.   However, presence of multiple variants may affect accuracy.         Lab Results   Component Value Date    MICALBCREAT 16.1 06/28/2019     Lab Results   Component Value Date    GPRNZLFN39 1350 (H) 05/24/2016     Lab Results   Component Value Date    TSH 1.29 12/17/2009     No results found for: TTGIGA    No results found for: CPEPTIDE, GAD65, ISLETCELLANT     Statin: Not taking  ACE/ARB: Not taking    Screening or Prevention Patient's value Goal Complete/Controlled?   HgA1C Testing and Control   Lab Results   Component Value Date    HGBA1C 6.4 (H) 05/04/2020      Annually/Less than 8% Yes   Lipid profile : 05/04/2020 Annually Yes   LDL control Lab Results   Component Value Date    LDLCALC 41.8 (L) 05/04/2020    Annually/Less than 100 mg/dl  Yes   Nephropathy screening Lab Results   Component Value Date    LABMICR 22.0 06/28/2019     Lab Results   Component Value Date    PROTEINUA Negative 07/31/2018    Annually No   Blood pressure BP Readings from Last 1 Encounters:   11/14/19 128/72    Less than 140/90 Yes   Dilated retinal exam : 10/25/2019 Annually No   Foot exam   : 10/10/2019 Annually No       Reviewed past surgical, medical, family, social history and updated as appropriate.    Review of Systems   Constitutional: Negative for activity change and unexpected weight change.   HENT: Negative for sore throat and voice change.    Eyes: Negative for visual disturbance.   Respiratory: Negative for shortness of breath.    Cardiovascular: Negative for chest pain.   Gastrointestinal: Negative for abdominal pain, constipation, diarrhea, nausea and vomiting.   Genitourinary: Negative for urgency.   Musculoskeletal: Negative for arthralgias.   Skin: Negative for wound.   Neurological: Negative for headaches.   Psychiatric/Behavioral: Negative for confusion and sleep  disturbance.       Objective:   There were no vitals taken for this visit.    Patient reports home vital signs to be:  Weight: 149 lb, blood pressure 114/63, pulse 78, temperature 97.2° F, 98% on room air,  Vital signs reviewed  There is no height or weight on file to calculate BMI.    Physical Exam  Constitutional:       Comments: Physical exam was not done, as this is a virtual visit.         Lab Reviewed:     Lab Results   Component Value Date    CHOL 126 05/04/2020    HDL 33 (L) 05/04/2020    LDLCALC 41.8 (L) 05/04/2020    TRIG 256 (H) 05/04/2020    CHOLHDL 26.2 05/04/2020       Lab Results   Component Value Date     05/04/2020    K 5.1 05/04/2020     05/04/2020    CO2 26 05/04/2020     (H) 05/04/2020    BUN 16 05/04/2020    CREATININE 1.4 05/04/2020    CALCIUM 9.1 05/04/2020    PROT 7.3 05/04/2020    ALBUMIN 3.8 05/04/2020    BILITOT 0.5 05/04/2020    ALKPHOS 105 05/04/2020    AST 23 05/04/2020    ALT 17 05/04/2020    ANIONGAP 8 05/04/2020    ESTGFRAFRICA 56.8 (A) 05/04/2020    EGFRNONAA 49.1 (A) 05/04/2020    TSH 1.29 12/17/2009        Lab Results   Component Value Date    [id number] 17 (L) 10/16/2019    [id number] 16 (L) 06/28/2019    [id number] 27 (L) 06/16/2017    CALCIUM 9.1 05/04/2020    CALCIUM 9.3 10/18/2019    CALCIUM 9.2 10/16/2019    CALCIUM 9.2 10/16/2019    ALKPHOS 105 05/04/2020    ALKPHOS 112 10/16/2019    ALKPHOS 115 06/28/2019    TSH 1.29 12/17/2009       Assessment and Plan     Malik Rosenberg is a 75 y.o. 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  - monitor CKD, can consider at SGLT-2 Inh for renal protection in the  future  - Advised pt to check glucoses 1-2x a day  - Diabetic supplies/medications: refilled this visit for 1 year  - follow-up on A1c on lab work tomorrow      Vitamin D deficiency  Patient is on ergocalciferol 01733 IU weekly  Reports sometimes missing doses  Check levels with upcoming labs      BPH without urinary obstruction  Elevated PSA, seen urology  Checking UA      Chronic kidney disease: stage 3A  Kidney function stable   Continue metformin 500 mg b.i.d., renally adjust  Consider SGLT-2 Inh  Checking uric acid  .    Essential Hypertension  Chronic, controlled  Continue management by PCP      Hyperlipidemia  ASCVD Risk below: On statin  The ASCVD Risk score (Rochesterangus HASTINGS Jr., et al., 2013) failed to calculate for the following reasons:    The systolic blood pressure is missing    The valid total cholesterol range is 130 to 320 mg/dL      Advised patient to follow up with PCP for routine health maintenance care.     RTC in once a year, unless there are changes  in kidney function, worsening diabetes    Ermias Yanez M.D  Endocrinology  [facility]  12/22/2020      Disclaimer: This note has been generated using voice-recognition software. There may be typographical errors that have been missed during proof-reading.      ADDEDUM: add virtual visit documentation    The patient location is:home, [place]  The chief complaint leading to consultation is: diabetes  Visit type: Virtual visit with synchronous audio and video  Total time spent with patient: 30    Each patient to whom he or she provides medical services by telemedicine is:  (1) informed of the relationship between the physician and patient and the respective role of any other health care provider with respect to management of the patient; and (2) notified that he or she may decline to receive medical services by telemedicine and may withdraw from such care at any time.

## 2020-12-23 ENCOUNTER — LAB VISIT (OUTPATIENT)
Dept: LAB | Facility: HOSPITAL | Age: 75
End: 2020-12-23
Attending: INTERNAL MEDICINE
Payer: COMMERCIAL

## 2020-12-23 ENCOUNTER — PATIENT MESSAGE (OUTPATIENT)
Dept: ENDOCRINOLOGY | Facility: CLINIC | Age: 75
End: 2020-12-23

## 2020-12-23 DIAGNOSIS — N18.31 STAGE 3A CHRONIC KIDNEY DISEASE: ICD-10-CM

## 2020-12-23 DIAGNOSIS — N40.0 BPH WITHOUT URINARY OBSTRUCTION: ICD-10-CM

## 2020-12-23 DIAGNOSIS — E11.69 TYPE 2 DIABETES MELLITUS WITH OTHER SPECIFIED COMPLICATION, WITHOUT LONG-TERM CURRENT USE OF INSULIN: ICD-10-CM

## 2020-12-23 DIAGNOSIS — E55.9 VITAMIN D DEFICIENCY DISEASE: ICD-10-CM

## 2020-12-23 DIAGNOSIS — E11.9 TYPE 2 DIABETES MELLITUS WITH HEMOGLOBIN A1C GOAL OF LESS THAN 7.0%: ICD-10-CM

## 2020-12-23 LAB
25(OH)D3+25(OH)D2 SERPL-MCNC: 17 NG/ML (ref 30–96)
ALBUMIN SERPL BCP-MCNC: 3.6 G/DL (ref 3.5–5.2)
ALP SERPL-CCNC: 128 U/L (ref 55–135)
ALT SERPL W/O P-5'-P-CCNC: 9 U/L (ref 10–44)
ANION GAP SERPL CALC-SCNC: 8 MMOL/L (ref 8–16)
AST SERPL-CCNC: 19 U/L (ref 10–40)
BASOPHILS # BLD AUTO: 0.13 K/UL (ref 0–0.2)
BASOPHILS NFR BLD: 1.1 % (ref 0–1.9)
BILIRUB SERPL-MCNC: 0.5 MG/DL (ref 0.1–1)
BUN SERPL-MCNC: 16 MG/DL (ref 8–23)
CALCIUM SERPL-MCNC: 8.7 MG/DL (ref 8.7–10.5)
CHLORIDE SERPL-SCNC: 106 MMOL/L (ref 95–110)
CHOLEST SERPL-MCNC: 115 MG/DL (ref 120–199)
CHOLEST/HDLC SERPL: 4.1 {RATIO} (ref 2–5)
CO2 SERPL-SCNC: 24 MMOL/L (ref 23–29)
CREAT SERPL-MCNC: 1.2 MG/DL (ref 0.5–1.4)
DIFFERENTIAL METHOD: ABNORMAL
EOSINOPHIL # BLD AUTO: 1.5 K/UL (ref 0–0.5)
EOSINOPHIL NFR BLD: 12 % (ref 0–8)
ERYTHROCYTE [DISTWIDTH] IN BLOOD BY AUTOMATED COUNT: 14 % (ref 11.5–14.5)
EST. GFR  (AFRICAN AMERICAN): >60 ML/MIN/1.73 M^2
EST. GFR  (NON AFRICAN AMERICAN): 59.2 ML/MIN/1.73 M^2
ESTIMATED AVG GLUCOSE: 137 MG/DL (ref 68–131)
GLUCOSE SERPL-MCNC: 112 MG/DL (ref 70–110)
HBA1C MFR BLD HPLC: 6.4 % (ref 4–5.6)
HCT VFR BLD AUTO: 36.9 % (ref 40–54)
HDLC SERPL-MCNC: 28 MG/DL (ref 40–75)
HDLC SERPL: 24.3 % (ref 20–50)
HGB BLD-MCNC: 10.5 G/DL (ref 14–18)
LDLC SERPL CALC-MCNC: 26.8 MG/DL (ref 63–159)
LYMPHOCYTES # BLD AUTO: 2.9 K/UL (ref 1–4.8)
LYMPHOCYTES NFR BLD: 23.7 % (ref 18–48)
MCH RBC QN AUTO: 23 PG (ref 27–31)
MCHC RBC AUTO-ENTMCNC: 28.5 G/DL (ref 32–36)
MCV RBC AUTO: 81 FL (ref 82–98)
MONOCYTES # BLD AUTO: 0.9 K/UL (ref 0.3–1)
MONOCYTES NFR BLD: 7.2 % (ref 4–15)
NEUTROPHILS # BLD AUTO: 6.9 K/UL (ref 1.8–7.7)
NEUTROPHILS NFR BLD: 55.7 % (ref 38–73)
NONHDLC SERPL-MCNC: 87 MG/DL
NRBC BLD-RTO: 0 /100 WBC
PLATELET # BLD AUTO: 253 K/UL (ref 150–350)
PMV BLD AUTO: 11.9 FL (ref 9.2–12.9)
POTASSIUM SERPL-SCNC: 4.9 MMOL/L (ref 3.5–5.1)
PROSTATE SPECIFIC ANTIGEN, TOTAL: 8.8 NG/ML (ref 0–4)
PROT SERPL-MCNC: 7.3 G/DL (ref 6–8.4)
PSA FREE MFR SERPL: 55.23 %
PSA FREE SERPL-MCNC: 4.86 NG/ML (ref 0.01–1.5)
RBC # BLD AUTO: 4.57 M/UL (ref 4.6–6.2)
SODIUM SERPL-SCNC: 138 MMOL/L (ref 136–145)
TRIGL SERPL-MCNC: 301 MG/DL (ref 30–150)
URATE SERPL-MCNC: 6.4 MG/DL (ref 3.4–7)
WBC # BLD AUTO: 12.29 K/UL (ref 3.9–12.7)

## 2020-12-23 PROCEDURE — 36415 COLL VENOUS BLD VENIPUNCTURE: CPT

## 2020-12-23 PROCEDURE — 85025 COMPLETE CBC W/AUTO DIFF WBC: CPT

## 2020-12-23 PROCEDURE — 84550 ASSAY OF BLOOD/URIC ACID: CPT

## 2020-12-23 PROCEDURE — 82306 VITAMIN D 25 HYDROXY: CPT

## 2020-12-23 PROCEDURE — 80061 LIPID PANEL: CPT

## 2020-12-23 PROCEDURE — 83036 HEMOGLOBIN GLYCOSYLATED A1C: CPT

## 2020-12-23 PROCEDURE — 84153 ASSAY OF PSA TOTAL: CPT

## 2020-12-23 PROCEDURE — 84154 ASSAY OF PSA FREE: CPT

## 2020-12-23 PROCEDURE — 80053 COMPREHEN METABOLIC PANEL: CPT

## 2020-12-28 ENCOUNTER — PATIENT MESSAGE (OUTPATIENT)
Dept: INTERNAL MEDICINE | Facility: CLINIC | Age: 75
End: 2020-12-28

## 2020-12-28 RX ORDER — NITROGLYCERIN 0.4 MG/1
0.4 TABLET SUBLINGUAL EVERY 5 MIN PRN
Qty: 25 TABLET | Refills: 11 | Status: SHIPPED | OUTPATIENT
Start: 2020-12-28 | End: 2022-03-24

## 2020-12-28 RX ORDER — EPINEPHRINE 0.3 MG/.3ML
1 INJECTION SUBCUTANEOUS ONCE AS NEEDED
Qty: 1 DEVICE | Refills: 3 | Status: SHIPPED | OUTPATIENT
Start: 2020-12-28 | End: 2020-12-31 | Stop reason: SDUPTHER

## 2020-12-28 RX ORDER — FENOFIBRATE 160 MG/1
160 TABLET ORAL DAILY
Qty: 90 TABLET | Refills: 3
Start: 2020-12-28 | End: 2020-12-31

## 2020-12-28 RX ORDER — ALBUTEROL SULFATE 90 UG/1
2 AEROSOL, METERED RESPIRATORY (INHALATION) EVERY 6 HOURS PRN
Qty: 36 G | Refills: 3 | Status: SHIPPED | OUTPATIENT
Start: 2020-12-28 | End: 2022-10-27 | Stop reason: SDUPTHER

## 2020-12-30 ENCOUNTER — PATIENT MESSAGE (OUTPATIENT)
Dept: INTERNAL MEDICINE | Facility: CLINIC | Age: 75
End: 2020-12-30

## 2020-12-31 ENCOUNTER — OFFICE VISIT (OUTPATIENT)
Dept: INTERNAL MEDICINE | Facility: CLINIC | Age: 75
End: 2020-12-31
Payer: COMMERCIAL

## 2020-12-31 VITALS
BODY MASS INDEX: 24.83 KG/M2 | OXYGEN SATURATION: 98 % | TEMPERATURE: 98 F | SYSTOLIC BLOOD PRESSURE: 116 MMHG | DIASTOLIC BLOOD PRESSURE: 68 MMHG | WEIGHT: 149 LBS | HEART RATE: 80 BPM | HEIGHT: 65 IN

## 2020-12-31 DIAGNOSIS — E55.9 VITAMIN D DEFICIENCY DISEASE: ICD-10-CM

## 2020-12-31 DIAGNOSIS — E11.69 TYPE 2 DIABETES MELLITUS WITH OTHER SPECIFIED COMPLICATION, WITHOUT LONG-TERM CURRENT USE OF INSULIN: ICD-10-CM

## 2020-12-31 DIAGNOSIS — N40.0 BPH WITHOUT URINARY OBSTRUCTION: ICD-10-CM

## 2020-12-31 DIAGNOSIS — D64.9 ANEMIA, UNSPECIFIED TYPE: ICD-10-CM

## 2020-12-31 DIAGNOSIS — R97.20 ELEVATED PSA: Primary | ICD-10-CM

## 2020-12-31 DIAGNOSIS — E78.2 MIXED DYSLIPIDEMIA: ICD-10-CM

## 2020-12-31 DIAGNOSIS — I10 ESSENTIAL HYPERTENSION: ICD-10-CM

## 2020-12-31 DIAGNOSIS — R07.2 PRECORDIAL PAIN: ICD-10-CM

## 2020-12-31 PROCEDURE — 99214 PR OFFICE/OUTPT VISIT, EST, LEVL IV, 30-39 MIN: ICD-10-PCS | Mod: 95,,, | Performed by: INTERNAL MEDICINE

## 2020-12-31 PROCEDURE — 99214 OFFICE O/P EST MOD 30 MIN: CPT | Mod: 95,,, | Performed by: INTERNAL MEDICINE

## 2020-12-31 RX ORDER — FERROUS GLUCONATE 324(37.5)
324 TABLET ORAL
COMMUNITY
Start: 2020-12-31 | End: 2021-02-02 | Stop reason: SDUPTHER

## 2020-12-31 RX ORDER — EPINEPHRINE 0.3 MG/.3ML
INJECTION SUBCUTANEOUS
Qty: 1 EACH | Refills: 3 | Status: SHIPPED | OUTPATIENT
Start: 2020-12-31 | End: 2022-10-27 | Stop reason: SDUPTHER

## 2020-12-31 RX ORDER — FENOFIBRATE 160 MG/1
160 TABLET ORAL DAILY
Qty: 90 TABLET | Refills: 3
Start: 2020-12-31 | End: 2021-02-02 | Stop reason: SDUPTHER

## 2020-12-31 RX ORDER — EPINEPHRINE 0.3 MG/.3ML
1 INJECTION SUBCUTANEOUS ONCE AS NEEDED
Qty: 1 DEVICE | Refills: 3 | Status: SHIPPED | OUTPATIENT
Start: 2020-12-31 | End: 2020-12-31

## 2020-12-31 NOTE — Clinical Note
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  He has been followed by [facility], but wants to come over here.  He asked me who he should see and I told him to see you.     THanks

## 2020-12-31 NOTE — PROGRESS NOTES
The patient location is: at home  The chief complaint leading to consultation is: follow up     Visit type: audiovisual    Face to Face time with patient:20  25 minutes of total time spent on the encounter, which includes face to face time and non-face to face time preparing to see the patient (eg, review of tests), Obtaining and/or reviewing separately obtained history, Documenting clinical information in the electronic or other health record, Independently interpreting results (not separately reported) and communicating results to the patient/family/caregiver, or Care coordination (not separately reported).             Each patient to whom he or she provides medical services by telemedicine is:  (1) informed of the relationship between the physician and patient and the respective role of any other health care provider with respect to management of the patient; and (2) notified that he or she may decline to receive medical services by telemedicine and may withdraw from such care at any time.    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  Several things were discussed.       He has diabetes mellitus type 2. For about 4 years.    He is on metformin for this.  Most   recent labs show that his hemoglobin A1c is 6.4.  Renal function is   unremarkable.  Fasting glucose was 112.     He has asthma.  He has an albuterol inhaler but does not use it at all.  No coughing or SOB.          He suffers from hypertension.  He is on amlodipine 2.5.  It   looks like his pressure continues to be elevated.  It was .  It comes   down to 116/68 at home.              He has BPH.  Most recent lab shows PSA    was 8.8  -- he goes to Urology at [facility]-- He is on tamilosin.  He has history of renal stones-- He wants to see Urology over her -- We discussed him calling Dr Bustos and a referral was placed.           MAIK in Sept during his ed visit-- 1.6 creatine-- it was 1.2 here in December           "  PHYSICAL EXAMINATION:      /68   Pulse 80   Temp 97.6 °F (36.4 °C)   Ht 5' 5" (1.651 m)   Wt 67.6 kg (149 lb)   SpO2 98%   BMI 24.79 kg/m²   Wife did vital and foot exam and she is "certified doctor" - family practice.         GENERAL:  He is a well-appearing [place] gentleman in no acute distress.  NECK:  Supple.  He has no JVD.  Thyroid is not enlarged.    Protective Sensation (w/ 10 gram monofilament):--   Right: Intact  Left: Intact     Visual Inspection:  Normal -  Bilateral     Pedal Pulses:   Right: Present  Left: Present     Posterior tibialis:   Right:Present  Left: Present        ASSESSMENT AND PLAN:  1.  mild anemia.  This is an of vegetarian gentleman with a history of a colectomy.  He had a colonoscopy back in 2015 and partial total colectomy due to diverticulitis about 3 years ago.  I will put him on iron for 2 months and recheck a CBC on him.    2.  Diabetes mellitus type 2.  Sugar is pretty well controlled.  Continue   current medications.  He has a LDL under 50 some accompaniment a statin and will start him on some fenofibrate for his triglycerides over 300.  3.  He has a history of elevated PSA t he was following with a doctor over at [facility] but I will send him over to Dr. Bustos since he wants to see a doctor here at [facility].    4.  Vitamin-D deficiency:  He has not been taking his vitamin-D well.  Will restart at [address]221 units once a week.    5.  He has a history of some chest pain but no history of coronary artery disease.  He would like to follow up Cardiology but his former cardiologists left.  Will have him see Dr. Lilly here and Cardiology.     6.  He also has a history of renal stones     Answers for HPI/ROS submitted by the patient on 12/24/2020   activity change: No  unexpected weight change: No  neck pain: No  hearing loss: No  rhinorrhea: No  trouble swallowing: No  eye discharge: No  visual disturbance: No  chest tightness: No  wheezing: No  chest pain: Yes  palpitations: " No  blood in stool: No  constipation: No  vomiting: No  diarrhea: Yes  polydipsia: Yes  polyuria: No  difficulty urinating: No  urgency: Yes  hematuria: No  joint swelling: No  arthralgias: No  headaches: No  weakness: No  confusion: No  dysphoric mood: No

## 2021-01-04 ENCOUNTER — PATIENT MESSAGE (OUTPATIENT)
Dept: UROLOGY | Facility: CLINIC | Age: 76
End: 2021-01-04

## 2021-01-04 ENCOUNTER — PATIENT MESSAGE (OUTPATIENT)
Dept: ADMINISTRATIVE | Facility: HOSPITAL | Age: 76
End: 2021-01-04

## 2021-01-09 ENCOUNTER — IMMUNIZATION (OUTPATIENT)
Dept: INTERNAL MEDICINE | Facility: CLINIC | Age: 76
End: 2021-01-09
Payer: COMMERCIAL

## 2021-01-09 DIAGNOSIS — Z23 NEED FOR VACCINATION: ICD-10-CM

## 2021-01-09 PROCEDURE — 91300 COVID-19, MRNA, LNP-S, PF, 30 MCG/0.3 ML DOSE VACCINE: CPT | Mod: PBBFAC | Performed by: INTERNAL MEDICINE

## 2021-01-09 PROCEDURE — 0002A COVID-19, MRNA, LNP-S, PF, 30 MCG/0.3 ML DOSE VACCINE: CPT | Mod: PBBFAC | Performed by: INTERNAL MEDICINE

## 2021-01-25 ENCOUNTER — PATIENT MESSAGE (OUTPATIENT)
Dept: OPHTHALMOLOGY | Facility: CLINIC | Age: 76
End: 2021-01-25

## 2021-01-26 ENCOUNTER — PATIENT OUTREACH (OUTPATIENT)
Dept: ADMINISTRATIVE | Facility: OTHER | Age: 76
End: 2021-01-26

## 2021-02-02 ENCOUNTER — PATIENT MESSAGE (OUTPATIENT)
Dept: INTERNAL MEDICINE | Facility: CLINIC | Age: 76
End: 2021-02-02

## 2021-02-02 RX ORDER — FENOFIBRATE 160 MG/1
160 TABLET ORAL DAILY
Qty: 90 TABLET | Refills: 0 | Status: SHIPPED | OUTPATIENT
Start: 2021-02-02 | End: 2021-04-15

## 2021-02-02 RX ORDER — FERROUS GLUCONATE 324(37.5)
324 TABLET ORAL
Qty: 90 TABLET | Refills: 0 | Status: SHIPPED | OUTPATIENT
Start: 2021-02-02 | End: 2021-04-15

## 2021-02-03 ENCOUNTER — PATIENT MESSAGE (OUTPATIENT)
Dept: INTERNAL MEDICINE | Facility: CLINIC | Age: 76
End: 2021-02-03

## 2021-02-04 ENCOUNTER — TELEPHONE (OUTPATIENT)
Dept: HEMATOLOGY/ONCOLOGY | Facility: CLINIC | Age: 76
End: 2021-02-04

## 2021-02-04 DIAGNOSIS — D53.9 NUTRITIONAL ANEMIA, UNSPECIFIED: ICD-10-CM

## 2021-02-04 DIAGNOSIS — D64.9 NORMOCYTIC ANEMIA: Primary | ICD-10-CM

## 2021-02-08 ENCOUNTER — HOSPITAL ENCOUNTER (OUTPATIENT)
Dept: RADIOLOGY | Facility: HOSPITAL | Age: 76
Discharge: HOME | End: 2021-02-08
Attending: INTERNAL MEDICINE
Payer: MEDICARE

## 2021-02-08 DIAGNOSIS — J98.4 RESTRICTIVE LUNG DISEASE: ICD-10-CM

## 2021-02-08 PROCEDURE — 71046 X-RAY EXAM CHEST 2 VIEWS: CPT | Mod: TC

## 2021-02-08 PROCEDURE — 71046 XR CHEST PA AND LATERAL: ICD-10-PCS | Mod: 26,,, | Performed by: RADIOLOGY

## 2021-02-08 PROCEDURE — 71046 X-RAY EXAM CHEST 2 VIEWS: CPT | Mod: 26,,, | Performed by: RADIOLOGY

## 2021-02-09 DIAGNOSIS — D50.9 IRON DEFICIENCY ANEMIA, UNSPECIFIED IRON DEFICIENCY ANEMIA TYPE: ICD-10-CM

## 2021-02-11 ENCOUNTER — OFFICE VISIT (OUTPATIENT)
Dept: HEMATOLOGY/ONCOLOGY | Facility: CLINIC | Age: 76
End: 2021-02-11
Payer: MEDICARE

## 2021-02-11 DIAGNOSIS — E11.69 TYPE 2 DIABETES MELLITUS WITH OTHER SPECIFIED COMPLICATION, WITHOUT LONG-TERM CURRENT USE OF INSULIN: ICD-10-CM

## 2021-02-11 DIAGNOSIS — E78.2 MIXED DYSLIPIDEMIA: ICD-10-CM

## 2021-02-11 DIAGNOSIS — D50.9 IRON DEFICIENCY ANEMIA, UNSPECIFIED IRON DEFICIENCY ANEMIA TYPE: Primary | ICD-10-CM

## 2021-02-11 DIAGNOSIS — N18.31 STAGE 3A CHRONIC KIDNEY DISEASE: ICD-10-CM

## 2021-02-11 DIAGNOSIS — K57.31 DIVERTICULOSIS OF LARGE INTESTINE WITH HEMORRHAGE: ICD-10-CM

## 2021-02-11 DIAGNOSIS — I10 ESSENTIAL HYPERTENSION: ICD-10-CM

## 2021-02-11 PROCEDURE — 99204 OFFICE O/P NEW MOD 45 MIN: CPT | Mod: 95,GC,, | Performed by: STUDENT IN AN ORGANIZED HEALTH CARE EDUCATION/TRAINING PROGRAM

## 2021-02-11 PROCEDURE — 99204 PR OFFICE/OUTPT VISIT, NEW, LEVL IV, 45-59 MIN: ICD-10-PCS | Mod: 95,GC,, | Performed by: STUDENT IN AN ORGANIZED HEALTH CARE EDUCATION/TRAINING PROGRAM

## 2021-02-15 ENCOUNTER — TELEPHONE (OUTPATIENT)
Dept: HEMATOLOGY/ONCOLOGY | Facility: CLINIC | Age: 76
End: 2021-02-15

## 2021-02-15 ENCOUNTER — PES CALL (OUTPATIENT)
Dept: ADMINISTRATIVE | Facility: CLINIC | Age: 76
End: 2021-02-15

## 2021-02-17 ENCOUNTER — TELEPHONE (OUTPATIENT)
Dept: GASTROENTEROLOGY | Facility: CLINIC | Age: 76
End: 2021-02-17

## 2021-02-19 ENCOUNTER — OFFICE VISIT (OUTPATIENT)
Dept: UROLOGY | Facility: CLINIC | Age: 76
End: 2021-02-19
Payer: COMMERCIAL

## 2021-02-19 VITALS
WEIGHT: 154.31 LBS | HEIGHT: 66 IN | SYSTOLIC BLOOD PRESSURE: 137 MMHG | BODY MASS INDEX: 24.8 KG/M2 | HEART RATE: 110 BPM | DIASTOLIC BLOOD PRESSURE: 83 MMHG

## 2021-02-19 DIAGNOSIS — R97.20 ELEVATED PSA: ICD-10-CM

## 2021-02-19 DIAGNOSIS — N52.9 ED (ERECTILE DYSFUNCTION) OF ORGANIC ORIGIN: ICD-10-CM

## 2021-02-19 DIAGNOSIS — N20.0 KIDNEY STONE: ICD-10-CM

## 2021-02-19 DIAGNOSIS — N40.0 BPH WITHOUT URINARY OBSTRUCTION: Primary | ICD-10-CM

## 2021-02-19 PROCEDURE — 99999 PR PBB SHADOW E&M-EST. PATIENT-LVL IV: CPT | Mod: PBBFAC,,, | Performed by: UROLOGY

## 2021-02-19 PROCEDURE — 99214 OFFICE O/P EST MOD 30 MIN: CPT | Mod: PBBFAC | Performed by: UROLOGY

## 2021-02-19 PROCEDURE — 99999 PR PBB SHADOW E&M-EST. PATIENT-LVL IV: ICD-10-PCS | Mod: PBBFAC,,, | Performed by: UROLOGY

## 2021-02-19 PROCEDURE — 99204 PR OFFICE/OUTPT VISIT, NEW, LEVL IV, 45-59 MIN: ICD-10-PCS | Mod: S$GLB,,, | Performed by: UROLOGY

## 2021-02-19 PROCEDURE — 99204 OFFICE O/P NEW MOD 45 MIN: CPT | Mod: S$GLB,,, | Performed by: UROLOGY

## 2021-02-19 RX ORDER — DUTASTERIDE 0.5 MG/1
0.5 CAPSULE, LIQUID FILLED ORAL DAILY
Qty: 90 CAPSULE | Refills: 3 | Status: SHIPPED | OUTPATIENT
Start: 2021-02-19 | End: 2021-03-21

## 2021-02-19 RX ORDER — TADALAFIL 20 MG/1
20 TABLET ORAL
Qty: 30 TABLET | Refills: 4 | Status: SHIPPED | OUTPATIENT
Start: 2021-02-19 | End: 2022-02-19

## 2021-02-19 RX ORDER — TAMSULOSIN HYDROCHLORIDE 0.4 MG/1
0.4 CAPSULE ORAL NIGHTLY
Qty: 90 CAPSULE | Refills: 3 | Status: SHIPPED | OUTPATIENT
Start: 2021-02-19 | End: 2022-03-22 | Stop reason: SDUPTHER

## 2021-02-22 ENCOUNTER — LAB VISIT (OUTPATIENT)
Dept: LAB | Facility: HOSPITAL | Age: 76
End: 2021-02-22
Payer: COMMERCIAL

## 2021-02-22 DIAGNOSIS — D50.9 IRON DEFICIENCY ANEMIA, UNSPECIFIED IRON DEFICIENCY ANEMIA TYPE: ICD-10-CM

## 2021-02-22 LAB
ANISOCYTOSIS BLD QL SMEAR: SLIGHT
BASOPHILS # BLD AUTO: 0.1 K/UL (ref 0–0.2)
BASOPHILS NFR BLD: 1 % (ref 0–1.9)
DIFFERENTIAL METHOD: ABNORMAL
EOSINOPHIL # BLD AUTO: 1.1 K/UL (ref 0–0.5)
EOSINOPHIL NFR BLD: 10.8 % (ref 0–8)
ERYTHROCYTE [DISTWIDTH] IN BLOOD BY AUTOMATED COUNT: 22.1 % (ref 11.5–14.5)
HCT VFR BLD AUTO: 38.6 % (ref 40–54)
HGB BLD-MCNC: 11.2 G/DL (ref 14–18)
LYMPHOCYTES # BLD AUTO: 2.3 K/UL (ref 1–4.8)
LYMPHOCYTES NFR BLD: 22.2 % (ref 18–48)
MCH RBC QN AUTO: 23.2 PG (ref 27–31)
MCHC RBC AUTO-ENTMCNC: 29 G/DL (ref 32–36)
MCV RBC AUTO: 80 FL (ref 82–98)
MONOCYTES # BLD AUTO: 0.9 K/UL (ref 0.3–1)
MONOCYTES NFR BLD: 8.9 % (ref 4–15)
NEUTROPHILS # BLD AUTO: 5.9 K/UL (ref 1.8–7.7)
NEUTROPHILS NFR BLD: 57.1 % (ref 38–73)
PLATELET # BLD AUTO: 221 K/UL (ref 150–350)
PMV BLD AUTO: 10.8 FL (ref 9.2–12.9)
RBC # BLD AUTO: 4.82 M/UL (ref 4.6–6.2)
RETICS/RBC NFR AUTO: 4.3 % (ref 0.4–2)
WBC # BLD AUTO: 10.4 K/UL (ref 3.9–12.7)

## 2021-02-22 PROCEDURE — 36415 COLL VENOUS BLD VENIPUNCTURE: CPT

## 2021-02-22 PROCEDURE — 85025 COMPLETE CBC W/AUTO DIFF WBC: CPT

## 2021-02-22 PROCEDURE — 85045 AUTOMATED RETICULOCYTE COUNT: CPT

## 2021-02-23 ENCOUNTER — PATIENT MESSAGE (OUTPATIENT)
Dept: INTERNAL MEDICINE | Facility: CLINIC | Age: 76
End: 2021-02-23

## 2021-03-02 ENCOUNTER — PATIENT MESSAGE (OUTPATIENT)
Dept: INTERNAL MEDICINE | Facility: CLINIC | Age: 76
End: 2021-03-02

## 2021-03-03 ENCOUNTER — PATIENT MESSAGE (OUTPATIENT)
Dept: INTERNAL MEDICINE | Facility: CLINIC | Age: 76
End: 2021-03-03

## 2021-03-08 ENCOUNTER — LAB VISIT (OUTPATIENT)
Dept: LAB | Facility: HOSPITAL | Age: 76
End: 2021-03-08
Attending: INTERNAL MEDICINE
Payer: MEDICARE

## 2021-03-08 DIAGNOSIS — D50.9 IRON DEFICIENCY ANEMIA, UNSPECIFIED IRON DEFICIENCY ANEMIA TYPE: Primary | ICD-10-CM

## 2021-03-08 DIAGNOSIS — D50.9 IRON DEFICIENCY ANEMIA, UNSPECIFIED IRON DEFICIENCY ANEMIA TYPE: ICD-10-CM

## 2021-03-08 LAB
BASOPHILS # BLD AUTO: 0.14 K/UL (ref 0–0.2)
BASOPHILS NFR BLD: 1.4 % (ref 0–1.9)
DIFFERENTIAL METHOD: ABNORMAL
EOSINOPHIL # BLD AUTO: 0.9 K/UL (ref 0–0.5)
EOSINOPHIL NFR BLD: 9.1 % (ref 0–8)
ERYTHROCYTE [DISTWIDTH] IN BLOOD BY AUTOMATED COUNT: 23.1 % (ref 11.5–14.5)
HCT VFR BLD AUTO: 43.6 % (ref 40–54)
HGB BLD-MCNC: 12.9 G/DL (ref 14–18)
LYMPHOCYTES # BLD AUTO: 2.3 K/UL (ref 1–4.8)
LYMPHOCYTES NFR BLD: 22.5 % (ref 18–48)
MCH RBC QN AUTO: 25.6 PG (ref 27–31)
MCHC RBC AUTO-ENTMCNC: 29.6 G/DL (ref 32–36)
MCV RBC AUTO: 87 FL (ref 82–98)
MONOCYTES # BLD AUTO: 0.9 K/UL (ref 0.3–1)
MONOCYTES NFR BLD: 9.2 % (ref 4–15)
NEUTROPHILS # BLD AUTO: 5.8 K/UL (ref 1.8–7.7)
NEUTROPHILS NFR BLD: 57.2 % (ref 38–73)
NRBC BLD-RTO: 0 /100 WBC
PLATELET # BLD AUTO: 250 K/UL (ref 150–350)
PMV BLD AUTO: 12.6 FL (ref 9.2–12.9)
RBC # BLD AUTO: 5.04 M/UL (ref 4.6–6.2)
WBC # BLD AUTO: 10.19 K/UL (ref 3.9–12.7)

## 2021-03-08 PROCEDURE — 36415 COLL VENOUS BLD VENIPUNCTURE: CPT | Performed by: STUDENT IN AN ORGANIZED HEALTH CARE EDUCATION/TRAINING PROGRAM

## 2021-03-08 PROCEDURE — 85025 COMPLETE CBC W/AUTO DIFF WBC: CPT | Performed by: STUDENT IN AN ORGANIZED HEALTH CARE EDUCATION/TRAINING PROGRAM

## 2021-04-19 ENCOUNTER — PATIENT MESSAGE (OUTPATIENT)
Dept: HEMATOLOGY/ONCOLOGY | Facility: CLINIC | Age: 76
End: 2021-04-19

## 2021-04-19 ENCOUNTER — PATIENT MESSAGE (OUTPATIENT)
Dept: INTERNAL MEDICINE | Facility: CLINIC | Age: 76
End: 2021-04-19

## 2021-04-19 DIAGNOSIS — D50.9 IRON DEFICIENCY ANEMIA, UNSPECIFIED IRON DEFICIENCY ANEMIA TYPE: Primary | ICD-10-CM

## 2021-04-19 DIAGNOSIS — R19.7 DIARRHEA, UNSPECIFIED TYPE: Primary | ICD-10-CM

## 2021-04-21 LAB
BASOPHILS # BLD AUTO: 0.1 X10E3/UL (ref 0–0.2)
BASOPHILS NFR BLD AUTO: 1 %
EOSINOPHIL # BLD AUTO: 1 X10E3/UL (ref 0–0.4)
EOSINOPHIL NFR BLD AUTO: 11 %
ERYTHROCYTE [DISTWIDTH] IN BLOOD BY AUTOMATED COUNT: 18.3 % (ref 11.6–15.4)
FERRITIN SERPL-MCNC: 24 NG/ML (ref 30–400)
HCT VFR BLD AUTO: 46.2 % (ref 37.5–51)
HGB BLD-MCNC: 14.5 G/DL (ref 13–17.7)
IMM GRANULOCYTES # BLD AUTO: 0 X10E3/UL (ref 0–0.1)
IMM GRANULOCYTES NFR BLD AUTO: 0 %
LYMPHOCYTES # BLD AUTO: 2.8 X10E3/UL (ref 0.7–3.1)
LYMPHOCYTES NFR BLD AUTO: 30 %
MCH RBC QN AUTO: 26.4 PG (ref 26.6–33)
MCHC RBC AUTO-ENTMCNC: 31.4 G/DL (ref 31.5–35.7)
MCV RBC AUTO: 84 FL (ref 79–97)
MONOCYTES # BLD AUTO: 0.7 X10E3/UL (ref 0.1–0.9)
MONOCYTES NFR BLD AUTO: 8 %
NEUTROPHILS # BLD AUTO: 4.7 X10E3/UL (ref 1.4–7)
NEUTROPHILS NFR BLD AUTO: 50 %
PLATELET # BLD AUTO: 210 X10E3/UL (ref 150–450)
RBC # BLD AUTO: 5.5 X10E6/UL (ref 4.14–5.8)
RETICS/RBC NFR AUTO: 1.5 % (ref 0.6–2.6)
WBC # BLD AUTO: 9.4 X10E3/UL (ref 3.4–10.8)

## 2021-04-28 ENCOUNTER — PES CALL (OUTPATIENT)
Dept: ADMINISTRATIVE | Facility: CLINIC | Age: 76
End: 2021-04-28

## 2021-05-04 ENCOUNTER — OFFICE VISIT (OUTPATIENT)
Dept: UROLOGY | Facility: CLINIC | Age: 76
End: 2021-05-04
Payer: MEDICARE

## 2021-05-04 ENCOUNTER — PATIENT MESSAGE (OUTPATIENT)
Dept: UROLOGY | Facility: CLINIC | Age: 76
End: 2021-05-04

## 2021-05-04 ENCOUNTER — PATIENT MESSAGE (OUTPATIENT)
Dept: HEMATOLOGY/ONCOLOGY | Facility: CLINIC | Age: 76
End: 2021-05-04

## 2021-05-04 ENCOUNTER — HOSPITAL ENCOUNTER (OUTPATIENT)
Dept: RADIOLOGY | Facility: HOSPITAL | Age: 76
Discharge: HOME | End: 2021-05-04
Attending: UROLOGY
Payer: MEDICARE

## 2021-05-04 ENCOUNTER — TELEPHONE (OUTPATIENT)
Dept: UROLOGY | Facility: HOSPITAL | Age: 76
End: 2021-05-04

## 2021-05-04 VITALS
BODY MASS INDEX: 24.8 KG/M2 | WEIGHT: 154.31 LBS | HEIGHT: 66 IN | HEART RATE: 82 BPM | SYSTOLIC BLOOD PRESSURE: 142 MMHG | DIASTOLIC BLOOD PRESSURE: 77 MMHG | RESPIRATION RATE: 16 BRPM

## 2021-05-04 DIAGNOSIS — N17.9 ACUTE RENAL FAILURE, UNSPECIFIED ACUTE RENAL FAILURE TYPE: Primary | ICD-10-CM

## 2021-05-04 DIAGNOSIS — R10.9 LEFT FLANK PAIN: Primary | ICD-10-CM

## 2021-05-04 DIAGNOSIS — N20.0 KIDNEY STONE ON LEFT SIDE: ICD-10-CM

## 2021-05-04 DIAGNOSIS — R31.29 HEMATURIA, MICROSCOPIC: Primary | ICD-10-CM

## 2021-05-04 DIAGNOSIS — R10.30 LOWER ABDOMINAL PAIN: ICD-10-CM

## 2021-05-04 DIAGNOSIS — N13.30 HYDRONEPHROSIS OF LEFT KIDNEY: ICD-10-CM

## 2021-05-04 DIAGNOSIS — R10.9 LEFT FLANK PAIN: ICD-10-CM

## 2021-05-04 DIAGNOSIS — N20.0 KIDNEY STONE: ICD-10-CM

## 2021-05-04 PROCEDURE — 99214 OFFICE O/P EST MOD 30 MIN: CPT | Mod: S$PBB,,, | Performed by: UROLOGY

## 2021-05-04 PROCEDURE — 99214 OFFICE O/P EST MOD 30 MIN: CPT | Mod: PBBFAC,25 | Performed by: UROLOGY

## 2021-05-04 PROCEDURE — 96372 THER/PROPH/DIAG INJ SC/IM: CPT | Mod: PBBFAC

## 2021-05-04 PROCEDURE — 74018 XR ABDOMEN AP 1 VIEW: ICD-10-PCS | Mod: 26,,, | Performed by: RADIOLOGY

## 2021-05-04 PROCEDURE — 74018 RADEX ABDOMEN 1 VIEW: CPT | Mod: TC

## 2021-05-04 PROCEDURE — 99999 PR PBB SHADOW E&M-EST. PATIENT-LVL IV: ICD-10-PCS | Mod: PBBFAC,,, | Performed by: UROLOGY

## 2021-05-04 PROCEDURE — 87086 URINE CULTURE/COLONY COUNT: CPT | Performed by: UROLOGY

## 2021-05-04 PROCEDURE — 99999 PR PBB SHADOW E&M-EST. PATIENT-LVL IV: CPT | Mod: PBBFAC,,, | Performed by: UROLOGY

## 2021-05-04 PROCEDURE — 74176 CT RENAL STONE STUDY ABD PELVIS WO: ICD-10-PCS | Mod: 26,,, | Performed by: RADIOLOGY

## 2021-05-04 PROCEDURE — 74018 RADEX ABDOMEN 1 VIEW: CPT | Mod: 26,,, | Performed by: RADIOLOGY

## 2021-05-04 PROCEDURE — 99214 PR OFFICE/OUTPT VISIT, EST, LEVL IV, 30-39 MIN: ICD-10-PCS | Mod: S$PBB,,, | Performed by: UROLOGY

## 2021-05-04 PROCEDURE — 74176 CT ABD & PELVIS W/O CONTRAST: CPT | Mod: TC

## 2021-05-04 PROCEDURE — 74176 CT ABD & PELVIS W/O CONTRAST: CPT | Mod: 26,,, | Performed by: RADIOLOGY

## 2021-05-04 RX ORDER — KETOROLAC TROMETHAMINE 30 MG/ML
30 INJECTION, SOLUTION INTRAMUSCULAR; INTRAVENOUS ONCE
Status: COMPLETED | OUTPATIENT
Start: 2021-05-04 | End: 2021-05-04

## 2021-05-04 RX ORDER — DUTASTERIDE 0.5 MG/1
0.5 CAPSULE, LIQUID FILLED ORAL DAILY
COMMUNITY
End: 2022-03-22 | Stop reason: SDUPTHER

## 2021-05-04 RX ORDER — KETOROLAC TROMETHAMINE 10 MG/1
10 TABLET, FILM COATED ORAL EVERY 6 HOURS PRN
Qty: 20 TABLET | Refills: 0 | Status: SHIPPED | OUTPATIENT
Start: 2021-05-04 | End: 2021-05-09

## 2021-05-04 RX ADMIN — KETOROLAC TROMETHAMINE 30 MG: 30 INJECTION, SOLUTION INTRAMUSCULAR; INTRAVENOUS at 10:05

## 2021-05-05 ENCOUNTER — PATIENT OUTREACH (OUTPATIENT)
Dept: ADMINISTRATIVE | Facility: OTHER | Age: 76
End: 2021-05-05

## 2021-05-05 DIAGNOSIS — E11.9 TYPE 2 DIABETES MELLITUS WITHOUT COMPLICATION, UNSPECIFIED WHETHER LONG TERM INSULIN USE: Primary | ICD-10-CM

## 2021-05-06 LAB — BACTERIA UR CULT: NO GROWTH

## 2021-05-07 ENCOUNTER — OFFICE VISIT (OUTPATIENT)
Dept: UROLOGY | Facility: CLINIC | Age: 76
End: 2021-05-07
Payer: MEDICARE

## 2021-05-07 ENCOUNTER — PATIENT MESSAGE (OUTPATIENT)
Dept: INTERNAL MEDICINE | Facility: CLINIC | Age: 76
End: 2021-05-07

## 2021-05-07 ENCOUNTER — PATIENT MESSAGE (OUTPATIENT)
Dept: UROLOGY | Facility: CLINIC | Age: 76
End: 2021-05-07

## 2021-05-07 ENCOUNTER — LAB VISIT (OUTPATIENT)
Dept: LAB | Facility: HOSPITAL | Age: 76
End: 2021-05-07
Attending: UROLOGY
Payer: MEDICARE

## 2021-05-07 DIAGNOSIS — N17.9 ACUTE RENAL FAILURE, UNSPECIFIED ACUTE RENAL FAILURE TYPE: ICD-10-CM

## 2021-05-07 DIAGNOSIS — R10.32 LEFT LOWER QUADRANT ABDOMINAL PAIN: ICD-10-CM

## 2021-05-07 DIAGNOSIS — R30.0 DYSURIA: ICD-10-CM

## 2021-05-07 DIAGNOSIS — N20.1 LEFT URETERAL STONE: Primary | ICD-10-CM

## 2021-05-07 DIAGNOSIS — E87.5 HYPERKALEMIA: ICD-10-CM

## 2021-05-07 DIAGNOSIS — N13.30 HYDRONEPHROSIS OF LEFT KIDNEY: ICD-10-CM

## 2021-05-07 DIAGNOSIS — E11.69 TYPE 2 DIABETES MELLITUS WITH OTHER SPECIFIED COMPLICATION, WITHOUT LONG-TERM CURRENT USE OF INSULIN: Primary | ICD-10-CM

## 2021-05-07 LAB
ANION GAP SERPL CALC-SCNC: 5 MMOL/L (ref 8–16)
BUN SERPL-MCNC: 15 MG/DL (ref 8–23)
CALCIUM SERPL-MCNC: 8.7 MG/DL (ref 8.7–10.5)
CHLORIDE SERPL-SCNC: 108 MMOL/L (ref 95–110)
CO2 SERPL-SCNC: 24 MMOL/L (ref 23–29)
CREAT SERPL-MCNC: 1.5 MG/DL (ref 0.5–1.4)
EST. GFR  (AFRICAN AMERICAN): 51.9 ML/MIN/1.73 M^2
EST. GFR  (NON AFRICAN AMERICAN): 44.9 ML/MIN/1.73 M^2
GLUCOSE SERPL-MCNC: 143 MG/DL (ref 70–110)
POTASSIUM SERPL-SCNC: 6 MMOL/L (ref 3.5–5.1)
SODIUM SERPL-SCNC: 137 MMOL/L (ref 136–145)

## 2021-05-07 PROCEDURE — 36415 COLL VENOUS BLD VENIPUNCTURE: CPT | Performed by: UROLOGY

## 2021-05-07 PROCEDURE — 99499 UNLISTED E&M SERVICE: CPT | Mod: 95,,, | Performed by: UROLOGY

## 2021-05-07 PROCEDURE — 99499 NO LOS: ICD-10-PCS | Mod: 95,,, | Performed by: UROLOGY

## 2021-05-07 PROCEDURE — 80048 BASIC METABOLIC PNL TOTAL CA: CPT | Performed by: UROLOGY

## 2021-05-07 RX ORDER — PHENAZOPYRIDINE HYDROCHLORIDE 200 MG/1
200 TABLET, FILM COATED ORAL 3 TIMES DAILY PRN
Qty: 20 TABLET | Refills: 1 | Status: SHIPPED | OUTPATIENT
Start: 2021-05-07 | End: 2022-03-24

## 2021-05-08 DIAGNOSIS — E87.5 HYPERKALEMIA: Primary | ICD-10-CM

## 2021-05-10 ENCOUNTER — LAB VISIT (OUTPATIENT)
Dept: LAB | Facility: HOSPITAL | Age: 76
End: 2021-05-10
Attending: INTERNAL MEDICINE
Payer: MEDICARE

## 2021-05-10 DIAGNOSIS — E87.5 HYPERKALEMIA: ICD-10-CM

## 2021-05-10 LAB
ANION GAP SERPL CALC-SCNC: 8 MMOL/L (ref 8–16)
BUN SERPL-MCNC: 13 MG/DL (ref 8–23)
CALCIUM SERPL-MCNC: 9.8 MG/DL (ref 8.7–10.5)
CHLORIDE SERPL-SCNC: 106 MMOL/L (ref 95–110)
CO2 SERPL-SCNC: 25 MMOL/L (ref 23–29)
CREAT SERPL-MCNC: 1.3 MG/DL (ref 0.5–1.4)
EST. GFR  (AFRICAN AMERICAN): >60 ML/MIN/1.73 M^2
EST. GFR  (NON AFRICAN AMERICAN): 53.4 ML/MIN/1.73 M^2
GLUCOSE SERPL-MCNC: 131 MG/DL (ref 70–110)
POTASSIUM SERPL-SCNC: 5.7 MMOL/L (ref 3.5–5.1)
SODIUM SERPL-SCNC: 139 MMOL/L (ref 136–145)

## 2021-05-10 PROCEDURE — 36415 COLL VENOUS BLD VENIPUNCTURE: CPT | Performed by: INTERNAL MEDICINE

## 2021-05-10 PROCEDURE — 80048 BASIC METABOLIC PNL TOTAL CA: CPT | Performed by: INTERNAL MEDICINE

## 2021-05-11 ENCOUNTER — PATIENT MESSAGE (OUTPATIENT)
Dept: UROLOGY | Facility: CLINIC | Age: 76
End: 2021-05-11

## 2021-05-17 ENCOUNTER — PES CALL (OUTPATIENT)
Dept: ADMINISTRATIVE | Facility: CLINIC | Age: 76
End: 2021-05-17

## 2021-05-24 ENCOUNTER — TELEPHONE (OUTPATIENT)
Dept: INTERNAL MEDICINE | Facility: CLINIC | Age: 76
End: 2021-05-24

## 2021-05-25 ENCOUNTER — PATIENT MESSAGE (OUTPATIENT)
Dept: OPHTHALMOLOGY | Facility: CLINIC | Age: 76
End: 2021-05-25

## 2021-05-25 ENCOUNTER — PATIENT MESSAGE (OUTPATIENT)
Dept: HEMATOLOGY/ONCOLOGY | Facility: CLINIC | Age: 76
End: 2021-05-25

## 2021-05-26 ENCOUNTER — LAB VISIT (OUTPATIENT)
Dept: LAB | Facility: HOSPITAL | Age: 76
End: 2021-05-26
Attending: INTERNAL MEDICINE
Payer: MEDICARE

## 2021-05-26 ENCOUNTER — TELEPHONE (OUTPATIENT)
Dept: UROLOGY | Facility: CLINIC | Age: 76
End: 2021-05-26

## 2021-05-26 DIAGNOSIS — D64.9 NORMOCYTIC ANEMIA: ICD-10-CM

## 2021-05-26 DIAGNOSIS — E87.5 HYPERKALEMIA: ICD-10-CM

## 2021-05-26 DIAGNOSIS — D50.9 IRON DEFICIENCY ANEMIA, UNSPECIFIED IRON DEFICIENCY ANEMIA TYPE: ICD-10-CM

## 2021-05-26 DIAGNOSIS — E11.69 TYPE 2 DIABETES MELLITUS WITH OTHER SPECIFIED COMPLICATION, WITHOUT LONG-TERM CURRENT USE OF INSULIN: ICD-10-CM

## 2021-05-26 DIAGNOSIS — E78.2 MIXED DYSLIPIDEMIA: ICD-10-CM

## 2021-05-26 DIAGNOSIS — R97.20 ELEVATED PSA: ICD-10-CM

## 2021-05-26 DIAGNOSIS — R97.20 ELEVATED PSA: Primary | ICD-10-CM

## 2021-05-26 DIAGNOSIS — D64.9 ANEMIA, UNSPECIFIED TYPE: ICD-10-CM

## 2021-05-26 LAB
ALBUMIN SERPL BCP-MCNC: 3.7 G/DL (ref 3.5–5.2)
ALP SERPL-CCNC: 121 U/L (ref 55–135)
ALT SERPL W/O P-5'-P-CCNC: 14 U/L (ref 10–44)
ANION GAP SERPL CALC-SCNC: 8 MMOL/L (ref 8–16)
AST SERPL-CCNC: 19 U/L (ref 10–40)
BASOPHILS # BLD AUTO: 0.13 K/UL (ref 0–0.2)
BASOPHILS NFR BLD: 1.4 % (ref 0–1.9)
BILIRUB SERPL-MCNC: 0.5 MG/DL (ref 0.1–1)
BUN SERPL-MCNC: 15 MG/DL (ref 8–23)
CALCIUM SERPL-MCNC: 9.2 MG/DL (ref 8.7–10.5)
CHLORIDE SERPL-SCNC: 107 MMOL/L (ref 95–110)
CHOLEST SERPL-MCNC: 140 MG/DL (ref 120–199)
CHOLEST/HDLC SERPL: 4.5 {RATIO} (ref 2–5)
CO2 SERPL-SCNC: 23 MMOL/L (ref 23–29)
COMPLEXED PSA SERPL-MCNC: 4.4 NG/ML (ref 0–4)
CREAT SERPL-MCNC: 1.3 MG/DL (ref 0.5–1.4)
DIFFERENTIAL METHOD: ABNORMAL
EOSINOPHIL # BLD AUTO: 1.1 K/UL (ref 0–0.5)
EOSINOPHIL NFR BLD: 11.3 % (ref 0–8)
ERYTHROCYTE [DISTWIDTH] IN BLOOD BY AUTOMATED COUNT: 14.5 % (ref 11.5–14.5)
EST. GFR  (AFRICAN AMERICAN): >60 ML/MIN/1.73 M^2
EST. GFR  (NON AFRICAN AMERICAN): 53.4 ML/MIN/1.73 M^2
ESTIMATED AVG GLUCOSE: 143 MG/DL (ref 68–131)
FERRITIN SERPL-MCNC: 13 NG/ML (ref 20–300)
GLUCOSE SERPL-MCNC: 137 MG/DL (ref 70–110)
HBA1C MFR BLD: 6.6 % (ref 4–5.6)
HCT VFR BLD AUTO: 43.3 % (ref 40–54)
HDLC SERPL-MCNC: 31 MG/DL (ref 40–75)
HDLC SERPL: 22.1 % (ref 20–50)
HGB BLD-MCNC: 13.5 G/DL (ref 14–18)
IMM GRANULOCYTES # BLD AUTO: 0.04 K/UL (ref 0–0.04)
IMM GRANULOCYTES NFR BLD AUTO: 0.4 % (ref 0–0.5)
IRON SERPL-MCNC: 80 UG/DL (ref 45–160)
LDLC SERPL CALC-MCNC: ABNORMAL MG/DL (ref 63–159)
LYMPHOCYTES # BLD AUTO: 2.1 K/UL (ref 1–4.8)
LYMPHOCYTES NFR BLD: 22.6 % (ref 18–48)
MCH RBC QN AUTO: 25.9 PG (ref 27–31)
MCHC RBC AUTO-ENTMCNC: 31.2 G/DL (ref 32–36)
MCV RBC AUTO: 83 FL (ref 82–98)
MONOCYTES # BLD AUTO: 0.8 K/UL (ref 0.3–1)
MONOCYTES NFR BLD: 8.3 % (ref 4–15)
NEUTROPHILS # BLD AUTO: 5.2 K/UL (ref 1.8–7.7)
NEUTROPHILS NFR BLD: 56 % (ref 38–73)
NONHDLC SERPL-MCNC: 109 MG/DL
NRBC BLD-RTO: 0 /100 WBC
PLATELET # BLD AUTO: 213 K/UL (ref 150–450)
PMV BLD AUTO: 10.7 FL (ref 9.2–12.9)
POTASSIUM SERPL-SCNC: 5 MMOL/L (ref 3.5–5.1)
PROT SERPL-MCNC: 7.3 G/DL (ref 6–8.4)
RBC # BLD AUTO: 5.21 M/UL (ref 4.6–6.2)
SATURATED IRON: 15 % (ref 20–50)
SODIUM SERPL-SCNC: 138 MMOL/L (ref 136–145)
TOTAL IRON BINDING CAPACITY: 546 UG/DL (ref 250–450)
TRANSFERRIN SERPL-MCNC: 369 MG/DL (ref 200–375)
TRIGL SERPL-MCNC: 421 MG/DL (ref 30–150)
WBC # BLD AUTO: 9.3 K/UL (ref 3.9–12.7)

## 2021-05-26 PROCEDURE — 36415 COLL VENOUS BLD VENIPUNCTURE: CPT | Performed by: INTERNAL MEDICINE

## 2021-05-26 PROCEDURE — 80061 LIPID PANEL: CPT | Performed by: INTERNAL MEDICINE

## 2021-05-26 PROCEDURE — 83036 HEMOGLOBIN GLYCOSYLATED A1C: CPT | Performed by: INTERNAL MEDICINE

## 2021-05-26 PROCEDURE — 84153 ASSAY OF PSA TOTAL: CPT | Performed by: UROLOGY

## 2021-05-26 PROCEDURE — 83540 ASSAY OF IRON: CPT | Performed by: INTERNAL MEDICINE

## 2021-05-26 PROCEDURE — 82728 ASSAY OF FERRITIN: CPT | Performed by: STUDENT IN AN ORGANIZED HEALTH CARE EDUCATION/TRAINING PROGRAM

## 2021-05-26 PROCEDURE — 85025 COMPLETE CBC W/AUTO DIFF WBC: CPT | Performed by: INTERNAL MEDICINE

## 2021-05-26 PROCEDURE — 80053 COMPREHEN METABOLIC PANEL: CPT | Performed by: STUDENT IN AN ORGANIZED HEALTH CARE EDUCATION/TRAINING PROGRAM

## 2021-05-27 ENCOUNTER — OFFICE VISIT (OUTPATIENT)
Dept: HEMATOLOGY/ONCOLOGY | Facility: CLINIC | Age: 76
End: 2021-05-27
Payer: MEDICARE

## 2021-05-27 DIAGNOSIS — N18.31 STAGE 3A CHRONIC KIDNEY DISEASE: ICD-10-CM

## 2021-05-27 DIAGNOSIS — D72.19 EOSINOPHILIC LEUKOCYTOSIS, UNSPECIFIED TYPE: ICD-10-CM

## 2021-05-27 DIAGNOSIS — I10 ESSENTIAL HYPERTENSION: ICD-10-CM

## 2021-05-27 DIAGNOSIS — J45.909 CHRONIC ASTHMA WITHOUT COMPLICATION, UNSPECIFIED ASTHMA SEVERITY, UNSPECIFIED WHETHER PERSISTENT: ICD-10-CM

## 2021-05-27 DIAGNOSIS — D50.9 IRON DEFICIENCY ANEMIA, UNSPECIFIED IRON DEFICIENCY ANEMIA TYPE: Primary | ICD-10-CM

## 2021-05-27 DIAGNOSIS — E11.69 TYPE 2 DIABETES MELLITUS WITH OTHER SPECIFIED COMPLICATION, WITHOUT LONG-TERM CURRENT USE OF INSULIN: ICD-10-CM

## 2021-05-27 PROCEDURE — 99214 PR OFFICE/OUTPT VISIT, EST, LEVL IV, 30-39 MIN: ICD-10-PCS | Mod: 95,,, | Performed by: INTERNAL MEDICINE

## 2021-05-27 PROCEDURE — 99214 OFFICE O/P EST MOD 30 MIN: CPT | Mod: 95,,, | Performed by: INTERNAL MEDICINE

## 2021-06-01 ENCOUNTER — OFFICE VISIT (OUTPATIENT)
Dept: INTERNAL MEDICINE | Facility: CLINIC | Age: 76
End: 2021-06-01
Payer: MEDICARE

## 2021-06-01 ENCOUNTER — LAB VISIT (OUTPATIENT)
Dept: LAB | Facility: HOSPITAL | Age: 76
End: 2021-06-01
Attending: INTERNAL MEDICINE
Payer: MEDICARE

## 2021-06-01 VITALS
DIASTOLIC BLOOD PRESSURE: 72 MMHG | BODY MASS INDEX: 24.84 KG/M2 | OXYGEN SATURATION: 99 % | HEIGHT: 66 IN | WEIGHT: 154.56 LBS | HEART RATE: 84 BPM | SYSTOLIC BLOOD PRESSURE: 124 MMHG

## 2021-06-01 DIAGNOSIS — R07.9 CHEST PAIN, UNSPECIFIED TYPE: Primary | ICD-10-CM

## 2021-06-01 DIAGNOSIS — R07.9 CHEST PAIN, UNSPECIFIED TYPE: ICD-10-CM

## 2021-06-01 DIAGNOSIS — E11.69 TYPE 2 DIABETES MELLITUS WITH OTHER SPECIFIED COMPLICATION, WITHOUT LONG-TERM CURRENT USE OF INSULIN: ICD-10-CM

## 2021-06-01 DIAGNOSIS — D50.9 IRON DEFICIENCY ANEMIA, UNSPECIFIED IRON DEFICIENCY ANEMIA TYPE: ICD-10-CM

## 2021-06-01 DIAGNOSIS — N18.31 STAGE 3A CHRONIC KIDNEY DISEASE: ICD-10-CM

## 2021-06-01 DIAGNOSIS — R97.20 ELEVATED PSA: ICD-10-CM

## 2021-06-01 DIAGNOSIS — N40.0 BPH WITHOUT URINARY OBSTRUCTION: ICD-10-CM

## 2021-06-01 LAB — TROPONIN I SERPL DL<=0.01 NG/ML-MCNC: <0.006 NG/ML (ref 0–0.03)

## 2021-06-01 PROCEDURE — 99999 PR PBB SHADOW E&M-EST. PATIENT-LVL IV: ICD-10-PCS | Mod: PBBFAC,,, | Performed by: INTERNAL MEDICINE

## 2021-06-01 PROCEDURE — 83520 IMMUNOASSAY QUANT NOS NONAB: CPT | Performed by: INTERNAL MEDICINE

## 2021-06-01 PROCEDURE — 84484 ASSAY OF TROPONIN QUANT: CPT | Performed by: INTERNAL MEDICINE

## 2021-06-01 PROCEDURE — 99999 PR PBB SHADOW E&M-EST. PATIENT-LVL IV: CPT | Mod: PBBFAC,,, | Performed by: INTERNAL MEDICINE

## 2021-06-01 PROCEDURE — 99214 OFFICE O/P EST MOD 30 MIN: CPT | Mod: S$PBB,,, | Performed by: INTERNAL MEDICINE

## 2021-06-01 PROCEDURE — 36415 COLL VENOUS BLD VENIPUNCTURE: CPT | Performed by: INTERNAL MEDICINE

## 2021-06-01 PROCEDURE — 99214 PR OFFICE/OUTPT VISIT, EST, LEVL IV, 30-39 MIN: ICD-10-PCS | Mod: S$PBB,,, | Performed by: INTERNAL MEDICINE

## 2021-06-01 PROCEDURE — 99214 OFFICE O/P EST MOD 30 MIN: CPT | Mod: PBBFAC | Performed by: INTERNAL MEDICINE

## 2021-06-01 RX ORDER — FENOFIBRATE 160 MG/1
160 TABLET ORAL DAILY
Qty: 90 TABLET | Refills: 3 | Status: SHIPPED | OUTPATIENT
Start: 2021-06-01

## 2021-06-03 LAB — TRYPTASE LEVEL: 6.8 NG/ML

## 2021-08-24 ENCOUNTER — PATIENT OUTREACH (OUTPATIENT)
Dept: ADMINISTRATIVE | Facility: HOSPITAL | Age: 76
End: 2021-08-24

## 2021-08-24 ENCOUNTER — PATIENT MESSAGE (OUTPATIENT)
Dept: ADMINISTRATIVE | Facility: HOSPITAL | Age: 76
End: 2021-08-24

## 2021-11-03 ENCOUNTER — PATIENT MESSAGE (OUTPATIENT)
Dept: INTERNAL MEDICINE | Facility: CLINIC | Age: 76
End: 2021-11-03
Payer: COMMERCIAL

## 2021-11-03 RX ORDER — AMLODIPINE BESYLATE 5 MG/1
TABLET ORAL
Qty: 90 TABLET | Refills: 3 | Status: SHIPPED | OUTPATIENT
Start: 2021-11-03 | End: 2022-10-27 | Stop reason: SDUPTHER

## 2021-12-22 DIAGNOSIS — E11.9 TYPE 2 DIABETES MELLITUS WITHOUT COMPLICATION: ICD-10-CM

## 2022-01-18 ENCOUNTER — PATIENT MESSAGE (OUTPATIENT)
Dept: ADMINISTRATIVE | Facility: HOSPITAL | Age: 77
End: 2022-01-18
Payer: COMMERCIAL

## 2022-02-24 ENCOUNTER — PATIENT MESSAGE (OUTPATIENT)
Dept: ADMINISTRATIVE | Facility: HOSPITAL | Age: 77
End: 2022-02-24
Payer: COMMERCIAL

## 2022-03-14 DIAGNOSIS — E11.9 TYPE 2 DIABETES MELLITUS WITHOUT COMPLICATION: ICD-10-CM

## 2022-03-17 ENCOUNTER — PATIENT MESSAGE (OUTPATIENT)
Dept: INTERNAL MEDICINE | Facility: CLINIC | Age: 77
End: 2022-03-17
Payer: COMMERCIAL

## 2022-03-17 ENCOUNTER — PATIENT MESSAGE (OUTPATIENT)
Dept: UROLOGY | Facility: CLINIC | Age: 77
End: 2022-03-17
Payer: COMMERCIAL

## 2022-03-17 ENCOUNTER — PATIENT MESSAGE (OUTPATIENT)
Dept: PULMONOLOGY | Facility: CLINIC | Age: 77
End: 2022-03-17
Payer: COMMERCIAL

## 2022-03-17 ENCOUNTER — TELEPHONE (OUTPATIENT)
Dept: INTERNAL MEDICINE | Facility: CLINIC | Age: 77
End: 2022-03-17
Payer: COMMERCIAL

## 2022-03-17 ENCOUNTER — PATIENT MESSAGE (OUTPATIENT)
Dept: OPHTHALMOLOGY | Facility: CLINIC | Age: 77
End: 2022-03-17
Payer: COMMERCIAL

## 2022-03-17 DIAGNOSIS — R97.20 ELEVATED PSA: Primary | ICD-10-CM

## 2022-03-17 DIAGNOSIS — E11.69 TYPE 2 DIABETES MELLITUS WITH OTHER SPECIFIED COMPLICATION, WITHOUT LONG-TERM CURRENT USE OF INSULIN: ICD-10-CM

## 2022-03-20 ENCOUNTER — PATIENT MESSAGE (OUTPATIENT)
Dept: INTERNAL MEDICINE | Facility: CLINIC | Age: 77
End: 2022-03-20
Payer: COMMERCIAL

## 2022-03-22 ENCOUNTER — LAB VISIT (OUTPATIENT)
Dept: LAB | Facility: HOSPITAL | Age: 77
End: 2022-03-22
Attending: INTERNAL MEDICINE
Payer: MEDICARE

## 2022-03-22 ENCOUNTER — OFFICE VISIT (OUTPATIENT)
Dept: UROLOGY | Facility: CLINIC | Age: 77
End: 2022-03-22
Payer: MEDICARE

## 2022-03-22 VITALS
DIASTOLIC BLOOD PRESSURE: 86 MMHG | BODY MASS INDEX: 24.77 KG/M2 | HEIGHT: 66 IN | HEART RATE: 93 BPM | WEIGHT: 154.13 LBS | SYSTOLIC BLOOD PRESSURE: 150 MMHG

## 2022-03-22 DIAGNOSIS — N52.9 ED (ERECTILE DYSFUNCTION) OF ORGANIC ORIGIN: ICD-10-CM

## 2022-03-22 DIAGNOSIS — E11.9 TYPE 2 DIABETES MELLITUS WITHOUT COMPLICATION: ICD-10-CM

## 2022-03-22 DIAGNOSIS — R97.20 ELEVATED PSA: Primary | ICD-10-CM

## 2022-03-22 DIAGNOSIS — N40.0 BPH WITHOUT URINARY OBSTRUCTION: ICD-10-CM

## 2022-03-22 DIAGNOSIS — E11.69 TYPE 2 DIABETES MELLITUS WITH OTHER SPECIFIED COMPLICATION, WITHOUT LONG-TERM CURRENT USE OF INSULIN: ICD-10-CM

## 2022-03-22 DIAGNOSIS — R97.20 ELEVATED PSA: ICD-10-CM

## 2022-03-22 LAB
ALBUMIN SERPL BCP-MCNC: 3.6 G/DL (ref 3.5–5.2)
ALP SERPL-CCNC: 113 U/L (ref 55–135)
ALT SERPL W/O P-5'-P-CCNC: 13 U/L (ref 10–44)
ANION GAP SERPL CALC-SCNC: 10 MMOL/L (ref 8–16)
AST SERPL-CCNC: 32 U/L (ref 10–40)
BILIRUB SERPL-MCNC: 0.4 MG/DL (ref 0.1–1)
BUN SERPL-MCNC: 15 MG/DL (ref 8–23)
CALCIUM SERPL-MCNC: 9.3 MG/DL (ref 8.7–10.5)
CHLORIDE SERPL-SCNC: 108 MMOL/L (ref 95–110)
CHOLEST SERPL-MCNC: 152 MG/DL (ref 120–199)
CHOLEST/HDLC SERPL: 4.5 {RATIO} (ref 2–5)
CO2 SERPL-SCNC: 24 MMOL/L (ref 23–29)
COMPLEXED PSA SERPL-MCNC: 4 NG/ML (ref 0–4)
COMPLEXED PSA SERPL-MCNC: 4 NG/ML (ref 0–4)
CREAT SERPL-MCNC: 1.3 MG/DL (ref 0.5–1.4)
EST. GFR  (AFRICAN AMERICAN): >60 ML/MIN/1.73 M^2
EST. GFR  (NON AFRICAN AMERICAN): 53.4 ML/MIN/1.73 M^2
ESTIMATED AVG GLUCOSE: 137 MG/DL (ref 68–131)
ESTIMATED AVG GLUCOSE: 137 MG/DL (ref 68–131)
GLUCOSE SERPL-MCNC: 138 MG/DL (ref 70–110)
HBA1C MFR BLD: 6.4 % (ref 4–5.6)
HBA1C MFR BLD: 6.4 % (ref 4–5.6)
HDLC SERPL-MCNC: 34 MG/DL (ref 40–75)
HDLC SERPL: 22.4 % (ref 20–50)
LDLC SERPL CALC-MCNC: 38 MG/DL (ref 63–159)
NONHDLC SERPL-MCNC: 118 MG/DL
POTASSIUM SERPL-SCNC: 5.5 MMOL/L (ref 3.5–5.1)
PROT SERPL-MCNC: 7.3 G/DL (ref 6–8.4)
SODIUM SERPL-SCNC: 142 MMOL/L (ref 136–145)
TRIGL SERPL-MCNC: 400 MG/DL (ref 30–150)

## 2022-03-22 PROCEDURE — 99214 PR OFFICE/OUTPT VISIT, EST, LEVL IV, 30-39 MIN: ICD-10-PCS | Mod: S$PBB,,, | Performed by: UROLOGY

## 2022-03-22 PROCEDURE — 80061 LIPID PANEL: CPT | Performed by: INTERNAL MEDICINE

## 2022-03-22 PROCEDURE — 99214 OFFICE O/P EST MOD 30 MIN: CPT | Mod: S$PBB,,, | Performed by: UROLOGY

## 2022-03-22 PROCEDURE — 84153 ASSAY OF PSA TOTAL: CPT | Performed by: INTERNAL MEDICINE

## 2022-03-22 PROCEDURE — 99999 PR PBB SHADOW E&M-EST. PATIENT-LVL IV: ICD-10-PCS | Mod: PBBFAC,,, | Performed by: UROLOGY

## 2022-03-22 PROCEDURE — 36415 COLL VENOUS BLD VENIPUNCTURE: CPT | Performed by: INTERNAL MEDICINE

## 2022-03-22 PROCEDURE — 99214 OFFICE O/P EST MOD 30 MIN: CPT | Mod: PBBFAC | Performed by: UROLOGY

## 2022-03-22 PROCEDURE — 80053 COMPREHEN METABOLIC PANEL: CPT | Performed by: INTERNAL MEDICINE

## 2022-03-22 PROCEDURE — 99999 PR PBB SHADOW E&M-EST. PATIENT-LVL IV: CPT | Mod: PBBFAC,,, | Performed by: UROLOGY

## 2022-03-22 PROCEDURE — 83036 HEMOGLOBIN GLYCOSYLATED A1C: CPT | Performed by: INTERNAL MEDICINE

## 2022-03-22 RX ORDER — TAMSULOSIN HYDROCHLORIDE 0.4 MG/1
0.4 CAPSULE ORAL NIGHTLY
Qty: 90 CAPSULE | Refills: 3 | Status: SHIPPED | OUTPATIENT
Start: 2022-03-22 | End: 2023-06-08

## 2022-03-22 RX ORDER — DUTASTERIDE 0.5 MG/1
0.5 CAPSULE, LIQUID FILLED ORAL DAILY
Qty: 90 CAPSULE | Refills: 3 | Status: SHIPPED | OUTPATIENT
Start: 2022-03-22 | End: 2022-10-27 | Stop reason: SDUPTHER

## 2022-03-22 RX ORDER — ALFUZOSIN HYDROCHLORIDE 10 MG/1
10 TABLET, EXTENDED RELEASE ORAL
Qty: 30 TABLET | Refills: 0 | Status: SHIPPED | OUTPATIENT
Start: 2022-03-22 | End: 2022-10-27 | Stop reason: SDUPTHER

## 2022-03-22 RX ORDER — TADALAFIL 10 MG/1
10 TABLET ORAL DAILY PRN
Qty: 30 TABLET | Refills: 5 | Status: SHIPPED | OUTPATIENT
Start: 2022-03-22 | End: 2023-06-08

## 2022-03-22 NOTE — PROGRESS NOTES
CC: LUTS, ED    HPI:     Malik BBO Rosenberg is a 76 y.o. man who is here for the Follow-up    He moved to [place], [place] to stay close to his daughter's family.  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    Doing well on cailis and flomax/avodart.  Voices no problem other than lack of ejaculation.    He had a hx of ureteral stone and was able to pass it by using expulsion therapy without surgical intervention previously.    Past Medical History:   Diagnosis Date    Asthma     Basal cell carcinoma 2007    left side forehead    Benign prostatic hypertrophy     Carotid artery occlusion     Cataract     Diabetes mellitus type II     Diverticulosis 1/2016    Hematoma 2016    right eye    Hyperlipidemia     Hypertension     Joint pain     Migraines      Past Surgical History:   Procedure Laterality Date    CHOLECYSTECTOMY      COLONOSCOPY Left 11/29/2015    Procedure: COLONOSCOPY;  Surgeon: Fabio Vizcarra MD;  Location: [facility];  Service: Endoscopy;  Laterality: Left;     Social History     Tobacco Use    Smoking status: Never Smoker    Smokeless tobacco: Never Used   Substance Use Topics    Alcohol use: No    Drug use: No     Family History   Problem Relation Age of Onset    Heart failure Mother     Asthma Father     Coronary artery disease Sister     Melanoma Neg Hx      Allergy:  Review of patient's allergies indicates:   Allergen Reactions    Codeine     Fenofibrate Nausea Only    Lisinopril      Cough    Shellfish containing products Swelling     Outpatient Encounter Medications as of 3/22/2022   Medication Sig Dispense Refill    albuterol (PROVENTIL/VENTOLIN HFA) 90 mcg/actuation inhaler Inhale 2 puffs into the lungs every 6 (six) hours as needed for Wheezing. 36 g 3    amLODIPine (NORVASC) 5 MG tablet TAKE 1 TABLET(5 MG) BY MOUTH EVERY DAY 90 tablet 3    cyanocobalamin (VITAMIN B-12) 1000 MCG tablet Take 1 tablet (1,000 mcg total)  by mouth once daily. 90 tablet 3    EPINEPHrine (EPIPEN) 0.3 mg/0.3 mL AtIn Inject 0.3 mLs (0.3 mg total) into the muscle once as needed 1 each 3    ergocalciferol (ERGOCALCIFEROL) 50,000 unit Cap TAKE 1 CAPSULE BY MOUTH EVERY 7 DAYS 12 capsule 0    fenofibrate 160 MG Tab Take 1 tablet (160 mg total) by mouth once daily. 90 tablet 3    pantoprazole (PROTONIX) 20 MG tablet TAKE 1 TABLET(20 MG) BY MOUTH EVERY DAY 90 tablet 3    phenazopyridine (PYRIDIUM) 200 MG tablet Take 1 tablet (200 mg total) by mouth 3 (three) times daily as needed for Pain. 20 tablet 1    tadalafiL (CIALIS) 20 MG Tab Take 1 tablet (20 mg total) by mouth as needed. 30 tablet 4    [DISCONTINUED] dutasteride (AVODART) 0.5 mg capsule Take 0.5 mg by mouth once daily.      [DISCONTINUED] tamsulosin (FLOMAX) 0.4 mg Cap Take 1 capsule (0.4 mg total) by mouth every evening. 90 capsule 3    alfuzosin (UROXATRAL) 10 mg Tb24 Take 1 tablet (10 mg total) by mouth daily with breakfast. 30 tablet 0    dutasteride (AVODART) 0.5 mg capsule Take 1 capsule (0.5 mg total) by mouth once daily. 90 capsule 3    ferrous gluconate (FERGON) 324 MG tablet TAKE 1 TABLET BY MOUTH DAILY WITH BREAKFAST 90 tablet 4    metFORMIN (GLUCOPHAGE-XR) 500 MG ER 24hr tablet Take 1 tablet (500 mg total) by mouth 2 (two) times daily with meals. 180 tablet 3    nitroGLYCERIN (NITROSTAT) 0.4 MG SL tablet Place 1 tablet (0.4 mg total) under the tongue every 5 (five) minutes as needed for Chest pain. (Patient not taking: No sig reported) 25 tablet 11    tadalafiL (CIALIS) 10 MG tablet Take 1 tablet (10 mg total) by mouth daily as needed for Erectile Dysfunction. 30 tablet 5    tamsulosin (FLOMAX) 0.4 mg Cap Take 1 capsule (0.4 mg total) by mouth every evening. 90 capsule 3     No facility-administered encounter medications on file as of 3/22/2022.     Review of Systems   ROS  Physical Exam     Vitals:    03/22/22 0832   BP: (!) 150/86   Pulse: 93     Physical  Exam  Constitutional:       General: He is not in acute distress.     Appearance: He is well-developed. He is not diaphoretic.      Comments: Mild to moderate distress   HENT:      Head: Normocephalic and atraumatic.      Right Ear: External ear normal.      Left Ear: External ear normal.      Nose: Nose normal.   Eyes:      Conjunctiva/sclera: Conjunctivae normal.      Pupils: Pupils are equal, round, and reactive to light.   Neck:      Thyroid: No thyromegaly.      Vascular: No JVD.      Trachea: No tracheal deviation.   Cardiovascular:      Rate and Rhythm: Normal rate and regular rhythm.      Heart sounds: Normal heart sounds. No murmur heard.    No friction rub. No gallop.   Pulmonary:      Effort: Pulmonary effort is normal. No respiratory distress.      Breath sounds: Normal breath sounds. No wheezing.   Chest:      Chest wall: No tenderness.   Abdominal:      General: Bowel sounds are normal. There is no distension.      Palpations: Abdomen is soft. There is no mass.      Tenderness: There is no abdominal tenderness. There is no guarding or rebound.   Genitourinary:     Penis: Normal. No tenderness.       Prostate: Normal.      Rectum: Normal.   Musculoskeletal:         General: No tenderness or deformity. Normal range of motion.      Cervical back: Normal range of motion and neck supple.      Comments: Positive CVA tenderness   Lymphadenopathy:      Cervical: No cervical adenopathy.   Skin:     General: Skin is warm and dry.   Neurological:      Mental Status: He is alert and oriented to person, place, and time.   Psychiatric:         Behavior: Behavior normal.         Thought Content: Thought content normal.       Genitalia:  Scrotum: no rash or lesion  Normal symmetric epididymis without masses  Normal vas palpated  Normal size, symmetric testicles with no masses   Normal urethral meatus with no discharge  Normal circumcised penis with no lesion   Rectal:  Normal perineum and anus upon inspection.  Normal  tone, no masses or tenderness;     LABS:  Lab Results   Component Value Date    PSA 5.3 (H) 06/28/2019    PSA 2.9 03/27/2019    PSA 6.3 (H) 03/01/2018    PSA 1.4 05/24/2016    PSA 1.6 03/25/2014    PSA 1.6 03/25/2014    PSA 1.9 12/31/2013    PSA 1.8 07/15/2011    PSA 1.7 02/24/2011    PSA 1.9 12/17/2009    PSADIAG 4.4 (H) 05/26/2021    PSATOTAL 8.8 (H) 12/23/2020    PSATOTAL 7.2 (H) 05/04/2020    PSATOTAL 3.5 07/31/2018    PSATOTAL 4.7 (H) 04/16/2018    PSATOTAL 1.4 03/10/2017    PSATOTAL 2.2 05/27/2015    PSATOTAL 1.8 07/15/2011    PSATOTAL 1.9 12/23/2010    PSAFREE 4.86 (H) 12/23/2020    PSAFREE 3.17 (H) 05/04/2020    PSAFREE 1.24 07/31/2018    PSAFREE 1.91 (H) 04/16/2018    PSAFREE 0.52 03/10/2017    PSAFREE 0.71 05/27/2015    PSAFREE 0.68 07/15/2011    PSAFREE 0.63 12/23/2010    PSAFREEPCT 55.23 12/23/2020    PSAFREEPCT 44.03 05/04/2020    PSAFREEPCT 35.43 07/31/2018    PSAFREEPCT 40.64 04/16/2018    PSAFREEPCT 37.14 03/10/2017    PSAFREEPCT 32.27 05/27/2015    PSAFREEPCT 37.78 07/15/2011    PSAFREEPCT 33.16 12/23/2010     Results for orders placed or performed in visit on 05/26/21   Prostate Specific Antigen, Diagnostic   Result Value Ref Range    PSA Diagnostic 4.4 (H) 0.00 - 4.00 ng/mL   Results for orders placed or performed in visit on 07/15/11   Prostate Specific Antigen, Diagnostic   Result Value Ref Range    PSA, Screen 1.8 0 - 4.0 ng/ml     Lab Results   Component Value Date    CREATININE 1.3 05/26/2021    CREATININE 1.3 05/10/2021    CREATININE 1.5 (H) 05/07/2021     No results found for this or any previous visit.  Urine Culture, Routine   Date Value Ref Range Status   05/04/2021 No growth  Final     Hemoglobin A1C   Date Value Ref Range Status   05/26/2021 6.6 (H) 4.0 - 5.6 % Final     Comment:     ADA Screening Guidelines:  5.7-6.4%  Consistent with prediabetes  >or=6.5%  Consistent with diabetes    High levels of fetal hemoglobin interfere with the HbA1C  assay. Heterozygous hemoglobin variants  (HbS, HgC, etc)do  not significantly interfere with this assay.   However, presence of multiple variants may affect accuracy.     12/23/2020 6.4 (H) 4.0 - 5.6 % Final     Comment:     ADA Screening Guidelines:  5.7-6.4%  Consistent with prediabetes  >or=6.5%  Consistent with diabetes  High levels of fetal hemoglobin interfere with the HbA1C  assay. Heterozygous hemoglobin variants (HbS, HgC, etc)do  not significantly interfere with this assay.   However, presence of multiple variants may affect accuracy.       KUB 5/4/21  No obvious stone noted in the course of left ureter    CT RSS 5/4/21  1. 2 mm left distal ureteral stone causing mild upstream hydroureteronephrosis.  2. Bilateral multiple nonobstructing nephroliths.  3. Prostatomegaly.  4. Cholelithiasis.    Assessment and Plan:  Malik was seen today for follow-up.    Diagnoses and all orders for this visit:    Elevated PSA  -     Prostate Specific Antigen, Diagnostic; Future    ED (erectile dysfunction) of organic origin  -     tadalafiL (CIALIS) 10 MG tablet; Take 1 tablet (10 mg total) by mouth daily as needed for Erectile Dysfunction.    BPH without urinary obstruction  -     tamsulosin (FLOMAX) 0.4 mg Cap; Take 1 capsule (0.4 mg total) by mouth every evening.  -     dutasteride (AVODART) 0.5 mg capsule; Take 1 capsule (0.5 mg total) by mouth once daily.  -     alfuzosin (UROXATRAL) 10 mg Tb24; Take 1 tablet (10 mg total) by mouth daily with breakfast.  -     Prostate Specific Antigen, Diagnostic; Future    continue flomax and avodart.  Try uroxatral instead of flomax to see it will avoid ejaculation disorder.  90 days refills given.    cialis 10 mg sent to [facility].  All questions answered.  I spent 25 minutes with the patient of which more than half was spent in direct consultation with the patient in regards to our treatment and plan.      Follow-up:  Follow up in about 1 year (around 3/22/2023) for PSA.

## 2022-03-22 NOTE — PATIENT INSTRUCTIONS
Lab Results   Component Value Date    PSA 5.3 (H) 06/28/2019    PSA 2.9 03/27/2019    PSA 6.3 (H) 03/01/2018    PSADIAG 4.4 (H) 05/26/2021    PSATOTAL 8.8 (H) 12/23/2020    PSATOTAL 7.2 (H) 05/04/2020    PSATOTAL 3.5 07/31/2018    PSAFREE 4.86 (H) 12/23/2020    PSAFREE 3.17 (H) 05/04/2020    PSAFREE 1.24 07/31/2018    PSAFREEPCT 55.23 12/23/2020    PSAFREEPCT 44.03 05/04/2020    PSAFREEPCT 35.43 07/31/2018

## 2022-03-24 ENCOUNTER — OFFICE VISIT (OUTPATIENT)
Dept: INTERNAL MEDICINE | Facility: CLINIC | Age: 77
End: 2022-03-24
Payer: MEDICARE

## 2022-03-24 VITALS
HEIGHT: 66 IN | SYSTOLIC BLOOD PRESSURE: 112 MMHG | HEART RATE: 85 BPM | DIASTOLIC BLOOD PRESSURE: 72 MMHG | OXYGEN SATURATION: 96 % | BODY MASS INDEX: 24.98 KG/M2 | WEIGHT: 155.44 LBS

## 2022-03-24 DIAGNOSIS — E11.69 TYPE 2 DIABETES MELLITUS WITH OTHER SPECIFIED COMPLICATION, WITHOUT LONG-TERM CURRENT USE OF INSULIN: ICD-10-CM

## 2022-03-24 DIAGNOSIS — E78.2 MIXED DYSLIPIDEMIA: Primary | ICD-10-CM

## 2022-03-24 DIAGNOSIS — R97.20 ELEVATED PSA: ICD-10-CM

## 2022-03-24 DIAGNOSIS — N18.31 STAGE 3A CHRONIC KIDNEY DISEASE: ICD-10-CM

## 2022-03-24 DIAGNOSIS — I77.9 CAROTID ARTERY DISEASE, UNSPECIFIED LATERALITY, UNSPECIFIED TYPE: ICD-10-CM

## 2022-03-24 PROCEDURE — 99999 PR PBB SHADOW E&M-EST. PATIENT-LVL IV: ICD-10-PCS | Mod: PBBFAC,,, | Performed by: INTERNAL MEDICINE

## 2022-03-24 PROCEDURE — 99214 OFFICE O/P EST MOD 30 MIN: CPT | Mod: PBBFAC | Performed by: INTERNAL MEDICINE

## 2022-03-24 PROCEDURE — 99999 PR PBB SHADOW E&M-EST. PATIENT-LVL IV: CPT | Mod: PBBFAC,,, | Performed by: INTERNAL MEDICINE

## 2022-03-24 PROCEDURE — 99214 PR OFFICE/OUTPT VISIT, EST, LEVL IV, 30-39 MIN: ICD-10-PCS | Mod: S$PBB,,, | Performed by: INTERNAL MEDICINE

## 2022-03-24 PROCEDURE — 99214 OFFICE O/P EST MOD 30 MIN: CPT | Mod: S$PBB,,, | Performed by: INTERNAL MEDICINE

## 2022-03-24 NOTE — PROGRESS NOTES
"  He is a 76-year-old  ( almost 77) gentleman coming in today to follow-up his ongoing medical problems.     He has diabetes mellitus type 2. For about 4 years.    He is on metformin for this.  Most   recent labs show that his hemoglobin A1c is 6.4.  Renal function is   unremarkable.  Fasting glucose was 138. He is tolerating the metformin well.  He denies any polyuria polydipsia.     He has asthma.  He has an albuterol inhaler but does not use it at all.  No coughing or SOB.          He suffers from hypertension.  He is on amlodipine 5 mg a day.  Blood pressure today is 112/72          He has BPH.  He also has history of elevated PSA.  He was seeing a urologist over at [facility], but he is now following with Dr. Bustos here at [facility] PSA was as high as 8.8 but it comes back down to 4.0..-- He is on tamilosin.  He has history of renal stones--          CKD--he had some MAIK earlier in the year but recent creatinine was down to 1.3.  His MAIK was related to ureteral stone.  He has hyperkalemia--his diet has been off-- more banana and fruits. Discussed.       ROS : Gen - no fatigue or significant weight change  Eyes - no eye pain or visual changes  ENT - no hoarseness or sore throat  CV - No chest pain or SOB.  NO palpitations.  Pulm - no cough or wheezing  GI - no N/V/D   no dysuria or incontinence  MS - no joint pain or muscle pain  Skin - no rash, or c/o of skin lesions  Neuro - no HA, dizziness--- memory is doing well.   Heme - no abnormal bleeding or bruising  Endo - no polydipsia, or temperature changes  Psych - no anxiety or depression.          PHYSICAL EXAMINATION:          /72 (BP Location: Right arm, Patient Position: Sitting, BP Method: Large (Manual))   Pulse 85   Ht 5' 6" (1.676 m)   Wt 70.5 kg (155 lb 6.8 oz)   SpO2 96%   BMI 25.09 kg/m²       General appearance: alert, appears stated age and cooperative  Ears: normal TM's and external ear canals both ears  Nose: Nares normal. Septum midline. " Mucosa normal. No drainage or sinus tenderness.  Neck: no adenopathy, no carotid bruit, no JVD, supple, symmetrical, trachea midline and thyroid not enlarged, symmetric, no tenderness/mass/nodules  Back: symmetric, no curvature. ROM normal. No CVA tenderness.  Lungs: clear to auscultation bilaterally  Chest wall: no tenderness  Heart: regular rate and rhythm, S1, S2 normal, no murmur, click, rub or gallop  Abdomen: soft, non-tender; bowel sounds normal; no masses,  no organomegaly  Extremities: extremities normal, atraumatic, no cyanosis or edema  Skin: Skin color, texture, turgor normal. No rashes or lesions               ASSESSMENT AND PLAN:  1.  mild anemia.  This is an of vegetarian gentleman with a history of a colectomy.  He had a colonoscopy back in 2015 and partial total colectomy due to diverticulitis about 4  years ago.    continues to follow-up with Hem/onc      2.  Diabetes mellitus type 2.  Sugar is pretty well controlled.  Continue   current medications.  He has a LDL under 50 some accompaniment a statin and will start him on some fenofibrate for his triglycerides over 300.  3.  He has a history of elevated PSA t he is  Following with Dr. Bustos since he wants to see a doctor here at [facility].    4.  Vitamin-D deficiency:  He has not been taking his vitamin-D well.  Will restart at 76967 units once a week.     5.  He has a history of some chest pain but no history of coronary artery disease.  He would like to follow up Cardiology but his former cardiologists left.  Will have him see Dr. Lilly here and Cardiology.     6.  He also has a history of renal stones-- follow up with Dr Bustos.    Continue his current medications and I will follow him up in around 6 months.  Reviewed the labs with him.

## 2022-04-20 DIAGNOSIS — E11.9 TYPE 2 DIABETES MELLITUS WITHOUT COMPLICATION: ICD-10-CM

## 2022-07-20 ENCOUNTER — PATIENT MESSAGE (OUTPATIENT)
Dept: OPHTHALMOLOGY | Facility: CLINIC | Age: 77
End: 2022-07-20
Payer: COMMERCIAL

## 2022-10-27 ENCOUNTER — PATIENT MESSAGE (OUTPATIENT)
Dept: INTERNAL MEDICINE | Facility: CLINIC | Age: 77
End: 2022-10-27
Payer: COMMERCIAL

## 2022-10-27 ENCOUNTER — PATIENT MESSAGE (OUTPATIENT)
Dept: UROLOGY | Facility: CLINIC | Age: 77
End: 2022-10-27
Payer: COMMERCIAL

## 2022-10-27 DIAGNOSIS — N40.0 BPH WITHOUT URINARY OBSTRUCTION: ICD-10-CM

## 2022-10-27 RX ORDER — DUTASTERIDE 0.5 MG/1
0.5 CAPSULE, LIQUID FILLED ORAL DAILY
Qty: 90 CAPSULE | Refills: 3 | Status: SHIPPED | OUTPATIENT
Start: 2022-10-27 | End: 2023-06-08

## 2022-10-27 RX ORDER — ALFUZOSIN HYDROCHLORIDE 10 MG/1
10 TABLET, EXTENDED RELEASE ORAL
Qty: 30 TABLET | Refills: 0 | Status: SHIPPED | OUTPATIENT
Start: 2022-10-27 | End: 2022-10-27

## 2022-10-27 RX ORDER — ALFUZOSIN HYDROCHLORIDE 10 MG/1
10 TABLET, EXTENDED RELEASE ORAL
Qty: 90 TABLET | Refills: 3 | Status: SHIPPED | OUTPATIENT
Start: 2022-10-27 | End: 2022-10-27

## 2022-10-27 NOTE — TELEPHONE ENCOUNTER
Please see the attached refill request.    BPH without urinary obstruction  -     dutasteride (AVODART) 0.5 mg capsule; Take 1 capsule (0.5 mg total) by mouth once daily.  Dispense: 90 capsule; Refill: 3  -     Discontinue: alfuzosin (UROXATRAL) 10 mg Tb24; Take 1 tablet (10 mg total) by mouth daily with breakfast.  Dispense: 30 tablet; Refill: 0  -     alfuzosin (UROXATRAL) 10 mg Tb24; Take 1 tablet (10 mg total) by mouth daily with breakfast.  Dispense: 90 tablet; Refill: 3

## 2023-03-07 ENCOUNTER — TELEPHONE (OUTPATIENT)
Dept: INTERNAL MEDICINE | Facility: CLINIC | Age: 78
End: 2023-03-07
Payer: COMMERCIAL

## 2023-03-07 RX ORDER — AMLODIPINE BESYLATE 5 MG/1
TABLET ORAL
Qty: 90 TABLET | Refills: 3 | Status: SHIPPED | OUTPATIENT
Start: 2023-03-07 | End: 2023-06-07 | Stop reason: SDUPTHER

## 2023-06-06 ENCOUNTER — PATIENT MESSAGE (OUTPATIENT)
Dept: INTERNAL MEDICINE | Facility: CLINIC | Age: 78
End: 2023-06-06
Payer: COMMERCIAL

## 2023-06-06 ENCOUNTER — PATIENT MESSAGE (OUTPATIENT)
Dept: UROLOGY | Facility: CLINIC | Age: 78
End: 2023-06-06
Payer: COMMERCIAL

## 2023-06-06 DIAGNOSIS — E11.69 TYPE 2 DIABETES MELLITUS WITH OTHER SPECIFIED COMPLICATION, WITHOUT LONG-TERM CURRENT USE OF INSULIN: ICD-10-CM

## 2023-06-06 DIAGNOSIS — R97.20 ELEVATED PSA: ICD-10-CM

## 2023-06-06 DIAGNOSIS — D50.9 IRON DEFICIENCY ANEMIA, UNSPECIFIED IRON DEFICIENCY ANEMIA TYPE: Primary | ICD-10-CM

## 2023-06-07 DIAGNOSIS — E11.69 TYPE 2 DIABETES MELLITUS WITH OTHER SPECIFIED COMPLICATION, WITHOUT LONG-TERM CURRENT USE OF INSULIN: ICD-10-CM

## 2023-06-07 DIAGNOSIS — E11.9 TYPE 2 DIABETES MELLITUS WITH HEMOGLOBIN A1C GOAL OF LESS THAN 7.0%: ICD-10-CM

## 2023-06-07 RX ORDER — PANTOPRAZOLE SODIUM 20 MG/1
20 TABLET, DELAYED RELEASE ORAL DAILY
Qty: 90 TABLET | Refills: 3 | Status: SHIPPED | OUTPATIENT
Start: 2023-06-07 | End: 2023-10-04

## 2023-06-07 RX ORDER — EPINEPHRINE 0.3 MG/.3ML
INJECTION SUBCUTANEOUS
Qty: 1 EACH | Refills: 3 | Status: SHIPPED | OUTPATIENT
Start: 2023-06-07

## 2023-06-07 RX ORDER — ALBUTEROL SULFATE 90 UG/1
2 AEROSOL, METERED RESPIRATORY (INHALATION) EVERY 6 HOURS PRN
Qty: 36 G | Refills: 3 | Status: SHIPPED | OUTPATIENT
Start: 2023-06-07

## 2023-06-07 RX ORDER — AMLODIPINE BESYLATE 5 MG/1
TABLET ORAL
Qty: 90 TABLET | Refills: 3 | Status: SHIPPED | OUTPATIENT
Start: 2023-06-07 | End: 2023-10-16

## 2023-06-07 RX ORDER — METFORMIN HYDROCHLORIDE 500 MG/1
500 TABLET, EXTENDED RELEASE ORAL 2 TIMES DAILY WITH MEALS
Qty: 180 TABLET | Refills: 3 | Status: SHIPPED | OUTPATIENT
Start: 2023-06-07

## 2023-06-07 NOTE — TELEPHONE ENCOUNTER
Care Due:                  Date            Visit Type   Department     Provider  --------------------------------------------------------------------------------                                EP -                              PRIMARY      [facility]  Last Visit: 03-      CARE ([facility])   MEDICINE       Rashard Carver  Next Visit: None Scheduled  None         None Found                                                            Last  Test          Frequency    Reason                     Performed    Due Date  --------------------------------------------------------------------------------    Office Visit  12 months..  albuterol, metFORMIN.....  03- 03-    Cr..........  12 months..  metFORMIN................  03- 03-    HBA1C.......  6 months...  metFORMIN................  03- 09-    [facility] Embedded Care Due Messages. Reference number: [id number].   6/07/2023 8:20:03 AM CDT

## 2023-06-08 DIAGNOSIS — N52.9 ED (ERECTILE DYSFUNCTION) OF ORGANIC ORIGIN: ICD-10-CM

## 2023-06-08 DIAGNOSIS — N40.0 BPH WITHOUT URINARY OBSTRUCTION: ICD-10-CM

## 2023-06-08 RX ORDER — ALFUZOSIN HYDROCHLORIDE 10 MG/1
TABLET, EXTENDED RELEASE ORAL
Qty: 90 TABLET | Refills: 3 | Status: SHIPPED | OUTPATIENT
Start: 2023-06-08

## 2023-06-08 RX ORDER — TAMSULOSIN HYDROCHLORIDE 0.4 MG/1
0.4 CAPSULE ORAL NIGHTLY
Qty: 90 CAPSULE | Refills: 3 | Status: SHIPPED | OUTPATIENT
Start: 2023-06-08 | End: 2023-06-08

## 2023-06-08 RX ORDER — DUTASTERIDE 0.5 MG/1
0.5 CAPSULE, LIQUID FILLED ORAL DAILY
Qty: 90 CAPSULE | Refills: 3 | Status: SHIPPED | OUTPATIENT
Start: 2023-06-08 | End: 2024-06-07

## 2023-06-08 RX ORDER — TADALAFIL 10 MG/1
10 TABLET ORAL DAILY PRN
Qty: 30 TABLET | Refills: 5 | Status: SHIPPED | OUTPATIENT
Start: 2023-06-08 | End: 2024-06-07

## 2023-06-08 NOTE — TELEPHONE ENCOUNTER
BPH without urinary obstruction  -     tamsulosin (FLOMAX) 0.4 mg Cap; Take 1 capsule (0.4 mg total) by mouth every evening.  Dispense: 90 capsule; Refill: 3  -     dutasteride (AVODART) 0.5 mg capsule; Take 1 capsule (0.5 mg total) by mouth once daily.  Dispense: 90 capsule; Refill: 3  -     alfuzosin (UROXATRAL) 10 mg Tb24; TAKE 1 TABLET(10 MG) BY MOUTH DAILY WITH BREAKFAST  Dispense: 90 tablet; Refill: 3

## 2023-06-08 NOTE — TELEPHONE ENCOUNTER
ED (erectile dysfunction) of organic origin  -     tadalafiL (CIALIS) 10 MG tablet; Take 1 tablet (10 mg total) by mouth daily as needed for Erectile Dysfunction.  Dispense: 30 tablet; Refill: 5

## 2023-06-18 ENCOUNTER — PATIENT MESSAGE (OUTPATIENT)
Dept: UROLOGY | Facility: CLINIC | Age: 78
End: 2023-06-18
Payer: COMMERCIAL

## 2023-06-18 ENCOUNTER — PATIENT MESSAGE (OUTPATIENT)
Dept: ENDOCRINOLOGY | Facility: CLINIC | Age: 78
End: 2023-06-18
Payer: COMMERCIAL

## 2023-06-18 ENCOUNTER — PATIENT MESSAGE (OUTPATIENT)
Dept: INTERNAL MEDICINE | Facility: CLINIC | Age: 78
End: 2023-06-18
Payer: COMMERCIAL

## 2023-06-18 DIAGNOSIS — N18.31 STAGE 3A CHRONIC KIDNEY DISEASE: Primary | ICD-10-CM

## 2023-06-26 ENCOUNTER — PATIENT MESSAGE (OUTPATIENT)
Dept: OPHTHALMOLOGY | Facility: CLINIC | Age: 78
End: 2023-06-26
Payer: COMMERCIAL

## 2023-06-26 ENCOUNTER — PATIENT MESSAGE (OUTPATIENT)
Dept: INTERNAL MEDICINE | Facility: CLINIC | Age: 78
End: 2023-06-26
Payer: COMMERCIAL

## 2023-07-12 ENCOUNTER — LAB VISIT (OUTPATIENT)
Dept: LAB | Facility: HOSPITAL | Age: 78
End: 2023-07-12
Attending: INTERNAL MEDICINE
Payer: MEDICARE

## 2023-07-12 ENCOUNTER — OFFICE VISIT (OUTPATIENT)
Dept: OPHTHALMOLOGY | Facility: CLINIC | Age: 78
End: 2023-07-12
Attending: OPHTHALMOLOGY
Payer: MEDICARE

## 2023-07-12 DIAGNOSIS — R97.20 ELEVATED PSA: ICD-10-CM

## 2023-07-12 DIAGNOSIS — E11.69 TYPE 2 DIABETES MELLITUS WITH OTHER SPECIFIED COMPLICATION, WITHOUT LONG-TERM CURRENT USE OF INSULIN: ICD-10-CM

## 2023-07-12 DIAGNOSIS — D50.9 IRON DEFICIENCY ANEMIA, UNSPECIFIED IRON DEFICIENCY ANEMIA TYPE: ICD-10-CM

## 2023-07-12 DIAGNOSIS — H25.13 NUCLEAR SCLEROSIS, BILATERAL: Primary | ICD-10-CM

## 2023-07-12 LAB
ALBUMIN SERPL BCP-MCNC: 3.6 G/DL (ref 3.5–5.2)
ALP SERPL-CCNC: 119 U/L (ref 55–135)
ALT SERPL W/O P-5'-P-CCNC: 16 U/L (ref 10–44)
ANION GAP SERPL CALC-SCNC: 7 MMOL/L (ref 8–16)
AST SERPL-CCNC: 14 U/L (ref 10–40)
BASOPHILS # BLD AUTO: 0.13 K/UL (ref 0–0.2)
BASOPHILS NFR BLD: 1.5 % (ref 0–1.9)
BILIRUB SERPL-MCNC: 0.5 MG/DL (ref 0.1–1)
BUN SERPL-MCNC: 15 MG/DL (ref 8–23)
CALCIUM SERPL-MCNC: 9.1 MG/DL (ref 8.7–10.5)
CHLORIDE SERPL-SCNC: 111 MMOL/L (ref 95–110)
CHOLEST SERPL-MCNC: 127 MG/DL (ref 120–199)
CHOLEST/HDLC SERPL: 4.2 {RATIO} (ref 2–5)
CO2 SERPL-SCNC: 25 MMOL/L (ref 23–29)
COMPLEXED PSA SERPL-MCNC: 2.6 NG/ML (ref 0–4)
CREAT SERPL-MCNC: 1.4 MG/DL (ref 0.5–1.4)
DIFFERENTIAL METHOD: ABNORMAL
EOSINOPHIL # BLD AUTO: 1 K/UL (ref 0–0.5)
EOSINOPHIL NFR BLD: 11.5 % (ref 0–8)
ERYTHROCYTE [DISTWIDTH] IN BLOOD BY AUTOMATED COUNT: 14.4 % (ref 11.5–14.5)
EST. GFR  (NO RACE VARIABLE): 51.8 ML/MIN/1.73 M^2
ESTIMATED AVG GLUCOSE: 140 MG/DL (ref 68–131)
GLUCOSE SERPL-MCNC: 120 MG/DL (ref 70–110)
HBA1C MFR BLD: 6.5 % (ref 4–5.6)
HCT VFR BLD AUTO: 45.9 % (ref 40–54)
HDLC SERPL-MCNC: 30 MG/DL (ref 40–75)
HDLC SERPL: 23.6 % (ref 20–50)
HGB BLD-MCNC: 13.8 G/DL (ref 14–18)
IMM GRANULOCYTES # BLD AUTO: 0.04 K/UL (ref 0–0.04)
IMM GRANULOCYTES NFR BLD AUTO: 0.5 % (ref 0–0.5)
LDLC SERPL CALC-MCNC: 30.4 MG/DL (ref 63–159)
LYMPHOCYTES # BLD AUTO: 2.1 K/UL (ref 1–4.8)
LYMPHOCYTES NFR BLD: 24.8 % (ref 18–48)
MCH RBC QN AUTO: 25.9 PG (ref 27–31)
MCHC RBC AUTO-ENTMCNC: 30.1 G/DL (ref 32–36)
MCV RBC AUTO: 86 FL (ref 82–98)
MONOCYTES # BLD AUTO: 0.8 K/UL (ref 0.3–1)
MONOCYTES NFR BLD: 9.1 % (ref 4–15)
NEUTROPHILS # BLD AUTO: 4.5 K/UL (ref 1.8–7.7)
NEUTROPHILS NFR BLD: 52.6 % (ref 38–73)
NONHDLC SERPL-MCNC: 97 MG/DL
NRBC BLD-RTO: 0 /100 WBC
PLATELET # BLD AUTO: 212 K/UL (ref 150–450)
PMV BLD AUTO: 11.9 FL (ref 9.2–12.9)
POTASSIUM SERPL-SCNC: 5.5 MMOL/L (ref 3.5–5.1)
PROT SERPL-MCNC: 7.2 G/DL (ref 6–8.4)
RBC # BLD AUTO: 5.32 M/UL (ref 4.6–6.2)
SODIUM SERPL-SCNC: 143 MMOL/L (ref 136–145)
TRIGL SERPL-MCNC: 333 MG/DL (ref 30–150)
WBC # BLD AUTO: 8.47 K/UL (ref 3.9–12.7)

## 2023-07-12 PROCEDURE — 80053 COMPREHEN METABOLIC PANEL: CPT | Performed by: INTERNAL MEDICINE

## 2023-07-12 PROCEDURE — 80061 LIPID PANEL: CPT | Performed by: INTERNAL MEDICINE

## 2023-07-12 PROCEDURE — 99213 OFFICE O/P EST LOW 20 MIN: CPT | Mod: PBBFAC | Performed by: OPHTHALMOLOGY

## 2023-07-12 PROCEDURE — 85025 COMPLETE CBC W/AUTO DIFF WBC: CPT | Performed by: INTERNAL MEDICINE

## 2023-07-12 PROCEDURE — 92014 COMPRE OPH EXAM EST PT 1/>: CPT | Mod: S$PBB,,, | Performed by: OPHTHALMOLOGY

## 2023-07-12 PROCEDURE — 92014 PR EYE EXAM, EST PATIENT,COMPREHESV: ICD-10-PCS | Mod: S$PBB,,, | Performed by: OPHTHALMOLOGY

## 2023-07-12 PROCEDURE — 83036 HEMOGLOBIN GLYCOSYLATED A1C: CPT | Performed by: INTERNAL MEDICINE

## 2023-07-12 PROCEDURE — 99999 PR PBB SHADOW E&M-EST. PATIENT-LVL III: ICD-10-PCS | Mod: PBBFAC,,, | Performed by: OPHTHALMOLOGY

## 2023-07-12 PROCEDURE — 36415 COLL VENOUS BLD VENIPUNCTURE: CPT | Performed by: INTERNAL MEDICINE

## 2023-07-12 PROCEDURE — 84153 ASSAY OF PSA TOTAL: CPT | Performed by: INTERNAL MEDICINE

## 2023-07-12 PROCEDURE — 99999 PR PBB SHADOW E&M-EST. PATIENT-LVL III: CPT | Mod: PBBFAC,,, | Performed by: OPHTHALMOLOGY

## 2023-07-12 RX ORDER — NITROGLYCERIN 0.4 MG/1
0.4 TABLET SUBLINGUAL
COMMUNITY
Start: 2022-08-08

## 2023-07-12 NOTE — PROGRESS NOTES
HPI    Last eye exam was 10/25/19 with Dr. Gregg.  Patient states slight decrease in distance vision since last exam. Using   +2.50 OTC readers for small print and computer. 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  Patient denies diplopia, headaches, flashes/floaters, and pain.      Erythromycin kingsley prn OU  Systane TID OU  Last edited by Luz Acuna MA on 7/12/2023  8:39 AM.            Assessment /Plan     For exam results, see Encounter Report.    Nuclear sclerosis, bilateral      Incipient cataract: Patient does reports mild visual decline, but not sufficient to affect activities of daily living. I recommend monitoring visual status and follow up when visual symptoms worsen.    IOP good, DFE nl   C.D 0.8 OU

## 2023-07-13 ENCOUNTER — OFFICE VISIT (OUTPATIENT)
Dept: ENDOCRINOLOGY | Facility: CLINIC | Age: 78
End: 2023-07-13
Payer: MEDICARE

## 2023-07-13 ENCOUNTER — OFFICE VISIT (OUTPATIENT)
Dept: INTERNAL MEDICINE | Facility: CLINIC | Age: 78
End: 2023-07-13
Payer: MEDICARE

## 2023-07-13 VITALS
DIASTOLIC BLOOD PRESSURE: 78 MMHG | HEIGHT: 66 IN | OXYGEN SATURATION: 99 % | HEART RATE: 93 BPM | BODY MASS INDEX: 24.77 KG/M2 | SYSTOLIC BLOOD PRESSURE: 134 MMHG | WEIGHT: 154.13 LBS

## 2023-07-13 VITALS
DIASTOLIC BLOOD PRESSURE: 81 MMHG | HEART RATE: 85 BPM | WEIGHT: 152.81 LBS | TEMPERATURE: 98 F | SYSTOLIC BLOOD PRESSURE: 132 MMHG | BODY MASS INDEX: 24.66 KG/M2

## 2023-07-13 DIAGNOSIS — N18.31 STAGE 3A CHRONIC KIDNEY DISEASE: ICD-10-CM

## 2023-07-13 DIAGNOSIS — E87.5 HYPERKALEMIA: ICD-10-CM

## 2023-07-13 DIAGNOSIS — D72.10 EOSINOPHILIA, UNSPECIFIED TYPE: ICD-10-CM

## 2023-07-13 DIAGNOSIS — E11.69 TYPE 2 DIABETES MELLITUS WITH OTHER SPECIFIED COMPLICATION, WITHOUT LONG-TERM CURRENT USE OF INSULIN: ICD-10-CM

## 2023-07-13 DIAGNOSIS — E78.2 MIXED DYSLIPIDEMIA: ICD-10-CM

## 2023-07-13 DIAGNOSIS — E11.69 TYPE 2 DIABETES MELLITUS WITH OTHER SPECIFIED COMPLICATION, WITHOUT LONG-TERM CURRENT USE OF INSULIN: Primary | ICD-10-CM

## 2023-07-13 DIAGNOSIS — E55.9 VITAMIN D DEFICIENCY DISEASE: ICD-10-CM

## 2023-07-13 DIAGNOSIS — D50.9 IRON DEFICIENCY ANEMIA, UNSPECIFIED IRON DEFICIENCY ANEMIA TYPE: ICD-10-CM

## 2023-07-13 DIAGNOSIS — E08.21 DIABETES DUE TO UNDERLYING CONDITION W DIABETIC NEPHROPATHY: Primary | ICD-10-CM

## 2023-07-13 DIAGNOSIS — E55.9 VITAMIN D DEFICIENCY: ICD-10-CM

## 2023-07-13 PROCEDURE — 99214 OFFICE O/P EST MOD 30 MIN: CPT | Mod: S$PBB,,, | Performed by: INTERNAL MEDICINE

## 2023-07-13 PROCEDURE — 99214 PR OFFICE/OUTPT VISIT, EST, LEVL IV, 30-39 MIN: ICD-10-PCS | Mod: S$PBB,,, | Performed by: INTERNAL MEDICINE

## 2023-07-13 PROCEDURE — 99214 PR OFFICE/OUTPT VISIT, EST, LEVL IV, 30-39 MIN: ICD-10-PCS | Mod: S$PBB,,, | Performed by: HOSPITALIST

## 2023-07-13 PROCEDURE — 99214 OFFICE O/P EST MOD 30 MIN: CPT | Mod: S$PBB,,, | Performed by: HOSPITALIST

## 2023-07-13 PROCEDURE — 99999 PR PBB SHADOW E&M-EST. PATIENT-LVL III: CPT | Mod: PBBFAC,,, | Performed by: INTERNAL MEDICINE

## 2023-07-13 PROCEDURE — 99213 OFFICE O/P EST LOW 20 MIN: CPT | Mod: PBBFAC | Performed by: HOSPITALIST

## 2023-07-13 PROCEDURE — 99999 PR PBB SHADOW E&M-EST. PATIENT-LVL III: ICD-10-PCS | Mod: PBBFAC,,, | Performed by: HOSPITALIST

## 2023-07-13 PROCEDURE — 99999 PR PBB SHADOW E&M-EST. PATIENT-LVL III: CPT | Mod: PBBFAC,,, | Performed by: HOSPITALIST

## 2023-07-13 PROCEDURE — 99999 PR PBB SHADOW E&M-EST. PATIENT-LVL III: ICD-10-PCS | Mod: PBBFAC,,, | Performed by: INTERNAL MEDICINE

## 2023-07-13 PROCEDURE — 99213 OFFICE O/P EST LOW 20 MIN: CPT | Mod: PBBFAC,27 | Performed by: INTERNAL MEDICINE

## 2023-07-13 RX ORDER — FENOFIBRATE 160 MG/1
160 TABLET ORAL DAILY
Qty: 90 TABLET | Refills: 3 | Status: SHIPPED | OUTPATIENT
Start: 2023-07-13 | End: 2024-07-12

## 2023-07-13 RX ORDER — ASPIRIN 325 MG
50000 TABLET, DELAYED RELEASE (ENTERIC COATED) ORAL WEEKLY
Qty: 24 CAPSULE | Refills: 2 | Status: SHIPPED | OUTPATIENT
Start: 2023-07-13

## 2023-07-13 RX ORDER — LANCETS
EACH MISCELLANEOUS
Qty: 150 EACH | Refills: 6 | Status: SHIPPED | OUTPATIENT
Start: 2023-07-13

## 2023-07-13 RX ORDER — INSULIN PUMP SYRINGE, 3 ML
EACH MISCELLANEOUS
Qty: 1 EACH | Refills: 0 | Status: SHIPPED | OUTPATIENT
Start: 2023-07-13

## 2023-07-13 RX ORDER — LANCING DEVICE
EACH MISCELLANEOUS
Qty: 1 EACH | Refills: 0 | Status: SHIPPED | OUTPATIENT
Start: 2023-07-13

## 2023-07-13 NOTE — ASSESSMENT & PLAN NOTE
- Patient is on ergocalciferol 78092 IU weekly  - Reports sometimes missing doses  - Check levels with upcoming labs

## 2023-07-13 NOTE — PROGRESS NOTES
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  Patient now lives in [place] full-time.  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  Patient reports that he is watching his diet.  Does not have glucometer or testing strips.  Does not check glucose   Does endorse drinking regular soda on occasion.    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  Patient is a vegetarian.  Does drink regular sodas    Current diabetes Medications:    Metformin 500 mg XR b.i.d.  Misses medication doses - no  Last Diabetes Education:  None  Hospitalized for diabetes:  Denies  SELF MONITORING BLOOD GLUCOSE:  Has not been checking glucose  Weight trend: stable  Eye exam current (within one year): yes, has appointment next year     Current exercise: walking  Family history of diabetes:  Yes  Statin: Not taking, ACE/ARB: Not taking    Diabetes lab work  Lab Results   Component Value Date    HGBA1C 6.5 (H) 07/12/2023    HGBA1C 6.4 (H) 03/22/2022    HGBA1C 6.4 (H) 03/22/2022    HGBA1C 6.6 (H) 05/26/2021     No results found for: CPEPTIDE,  GLUTAMICACID, ISLETCELLANT   No results found for: FRUCTOSAMINE  Lab Results   Component Value Date    MICALBCREAT 15.8 12/23/2020     Lab Results   Component Value Date    RAOONGOV94 306 02/08/2021     Diabetes Management Status: Reviewed this office visit  Screening or Prevention Patient's value Goal Complete/Controlled?   Lipid profile : 07/12/2023 Annually Yes   Dilated retinal exam : 07/12/2023 Annually Yes   Foot exam   Most Recent Foot Exam Date: Not Found Annually Yes        2) Vitamin D Def  - Noted on lab work  - On supplement: Vit D3:  Ergocalciferol once a week    Last lab work  Lab Results   Component Value Date    ZCHWWEIB61MK 17 (L) 12/23/2020    WBTDSQON40SP 17 (L) 10/16/2019    FDDYQXAS57XQ 16 (L) 06/28/2019    CALCIUM 9.1 07/12/2023    CALCIUM 9.3 03/22/2022    CALCIUM 9.2 05/26/2021    ALKPHOS 119 07/12/2023    ALKPHOS 113 03/22/2022    ALKPHOS 121 05/26/2021    TSH 1.29 12/17/2009      Reviewed past surgical, medical, family, social history and updated as appropriate.  Review of Systems: see HPI above  Objective:   /81   Pulse 85   Temp 97.5 °F (36.4 °C) (Oral)   Wt 69.3 kg (152 lb 12.8 oz)   BMI 24.66 kg/m²   Body mass index is 24.66 kg/m².  Vital signs reviewed    Physical Exam  Vitals and nursing note reviewed.   Constitutional:       Appearance: Normal appearance. He is well-developed. He is obese. He is not ill-appearing.   Neck:      Thyroid: No thyromegaly.   Pulmonary:      Effort: Pulmonary effort is normal. No respiratory distress.   Musculoskeletal:         General: Normal range of motion.      Cervical back: Normal range of motion.   Neurological:      General: No focal deficit present.      Mental Status: He is alert. Mental status is at baseline.   Psychiatric:         Mood and Affect: Mood normal.         Behavior: Behavior normal.     Lab Reviewed:  See results in subjective  Lab Results   Component Value Date    HGBA1C 6.5 (H) 07/12/2023     Lab Results   Component  Value Date    CHOL 127 07/12/2023    HDL 30 (L) 07/12/2023    LDLCALC 30.4 (L) 07/12/2023    TRIG 333 (H) 07/12/2023    CHOLHDL 23.6 07/12/2023     Lab Results   Component Value Date     07/12/2023    K 5.5 (H) 07/12/2023     (H) 07/12/2023    CO2 25 07/12/2023     (H) 07/12/2023    BUN 15 07/12/2023    CREATININE 1.4 07/12/2023    CALCIUM 9.1 07/12/2023    PROT 7.2 07/12/2023    ALBUMIN 3.6 07/12/2023    BILITOT 0.5 07/12/2023    ALKPHOS 119 07/12/2023    AST 14 07/12/2023    ALT 16 07/12/2023    ANIONGAP 7 (L) 07/12/2023    ESTGFRAFRICA >60.0 03/22/2022    EGFRNONAA 53.4 (A) 03/22/2022    TSH 1.29 12/17/2009    TZQUHOLF46GT 17 (L) 12/23/2020     Assessment     1. Type 2 diabetes mellitus with other specified complication, without long-term current use of insulin  lancing device Misc    lancets Misc    blood-glucose meter kit    blood sugar diagnostic Strp      2. Vitamin D deficiency  Vitamin D      3. Stage 3a chronic kidney disease        4. 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    Plan  - Continue metformin  mg b.i.d.  - Monitor CKD, can consider at SGLT-2 Inh for renal protection in the future  - Discuss GLP1 including Ozempic/Trulicity/Mounjaro, patient declined  - Advised pt to check glucoses 1-2x a week for monitoring.  New glucometer sent to pharmacy  - Diabetic supplies/medications: refilled this visit for 1 year  - Follow-up on A1c on lab work tomorrow    CKD (chronic kidney disease) stage 3, GFR 30-59 ml/min  - stable, continue monitoring with PCP    Vitamin D deficiency disease  - Patient is on ergocalciferol 71542 IU weekly  - Reports sometimes missing doses  - Check levels with  upcoming labs    Advised patient to follow up with PCP for routine health maintenance care.   RTC in once a year, unless there are changes  in kidney function, worsening diabetes    Ermias Yanez M.D.  Endocrinology  [facility]  7/13/2023      Disclaimer: This note has been generated in part with the use of voice-recognition software. There may be typographical errors that have been missed during proof-reading.

## 2023-07-13 NOTE — PROGRESS NOTES
He is a 78-year-old  gentleman coming in today to follow-up his ongoing medical problems.  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     He has diabetes mellitus type 2. For about 4 years.    He is on metformin for this.  Most   recent labs show that his hemoglobin A1c is 6.5.  He has some CKD stage 3a with this.  Fasting glucose was 138. He is tolerating the metformin well.  He denies any polyuria polydipsia.     He has asthma.  He has an albuterol inhaler but does not use it at all.  No coughing or SOB.         He has some hypokalemia.  Potassium is 5.5.  He is no longer on an Ace or ARB.  He does a lot of vegetables a lot of food high in potassium.  Potassium is stable from last visit after we discussed a change in his diet.     He suffers from hypertension.  He is on amlodipine 5 mg a day.  Blood pressure today is 134/78.            He has BPH.  He also has history of elevated PSA.  He was seeing a urologist [facility] at [facility], but he is now following with Dr. Bustos here at [facility] PSA was as high as 8.8 but it comes back down to 2.6.-- He is on tamilosin.  He has history of renal stones--          CKD--stage IIIA.  he had some MAIK earlier in the year but recent creatinine was down to 1.4 and estimated GFR is 52.  His MAIK was related to ureteral stone.  He has hyperkalemia--his diet has been off-- more banana and fruits. Discussed.      He also has a elevated eosinophil count at 11.5% ---this has been worked up in the past including cultures and analysis I am not sure if he had a bone marrow biopsy but this has been worked up felt to be due to some unknown asymptomatic encounter with parasites question-albert    He also has hypertriglyceridemia.  Triglycerides are down to 333 with HDL 30 and LDL of 30.  He is supposed to be on fenofibrate but he is not taking this at the current time.       ROS : Gen - no fatigue or significant weight change  Eyes - no eye pain or visual changes  ENT - no hoarseness or  General surgery are the primary service. POD #4 Exploratory laparotomy, adhesiolysis, small bowel resection, new enteroenterostomy creation x2, closure with repair of incisional hernia with use of prior mesh. Postoperative care per general surgery.   "sore throat  CV - No chest pain or SOB.  NO palpitations.  Pulm - no cough or wheezing  GI - no N/V/D   no dysuria or incontinence  MS - no joint pain or muscle pain  Skin - no rash, or c/o of skin lesions  Neuro - no HA, dizziness--- memory is doing well.   Heme - no abnormal bleeding or bruising  Endo - no polydipsia, or temperature changes  Psych - no anxiety or depression.          PHYSICAL EXAMINATION:       /78 (BP Location: Right arm, Patient Position: Sitting, BP Method: Medium (Manual))   Pulse 93   Ht 5' 6" (1.676 m)   Wt 69.9 kg (154 lb 1.6 oz)   SpO2 99%   BMI 24.87 kg/m²           General appearance: alert, appears stated age and cooperative  Ears: normal TM's and external ear canals both ears  Nose: Nares normal. Septum midline. Mucosa normal. No drainage or sinus tenderness.  Neck: no adenopathy, no carotid bruit, no JVD, supple, symmetrical, trachea midline and thyroid not enlarged, symmetric, no tenderness/mass/nodules  Back: symmetric, no curvature. ROM normal. No CVA tenderness.  Lungs: clear to auscultation bilaterally  Chest wall: no tenderness  Heart: regular rate and rhythm, S1, S2 normal, no murmur, click, rub or gallop  Abdomen: soft, non-tender; bowel sounds normal; no masses,  no organomegaly  Extremities: extremities normal, atraumatic, no cyanosis or edema  Skin: Skin color, texture, turgor normal. No rashes or lesions               ASSESSMENT AND PLAN:  1.  mild anemia.  This is pretty much resolved.  He continues to have elevated eosinophil count.  We are going to continue monitoring this.      2.  Diabetes mellitus type 2.  Sugar is pretty well controlled.  Continue   current medications.  He has a LDL under 50 some accompaniment a statin and will restart him on some fenofibrate for his triglycerides over 300.  3.  He has a history of elevated PSA t he is  Following with Dr. Bustos since he wants to see a doctor here at [facility].    4.  Vitamin-D deficiency:  We are going to " refill his vitamin-D continue 20418 units once a week.    5.  Hyperkalemia we are going to start him on some Kayexalate and I asked him get a potassium or chemistry drawn in a couple weeks when he is back in [place] let me know how that looks.    6.  He also has a history of renal stones-- follow up with Dr Bustos.     Continue his current medications and I will follow him up in around 6 months.  Reviewed the labs with hi.  I will see Dr. Rosenberg.  next time he is back in town or when he he has been seeing him but I would like to follow up the hyperkalemia not

## 2023-07-13 NOTE — ASSESSMENT & PLAN NOTE
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    Plan  - Continue metformin  mg b.i.d.  - Monitor CKD, can consider at SGLT-2 Inh for renal protection in the future  - Discuss GLP1 including Ozempic/Trulicity/Mounjaro, patient declined  - Advised pt to check glucoses 1-2x a week for monitoring.  New glucometer sent to pharmacy  - Diabetic supplies/medications: refilled this visit for 1 year  - Follow-up on A1c on lab work tomorrow

## 2023-07-14 ENCOUNTER — LAB VISIT (OUTPATIENT)
Dept: LAB | Facility: HOSPITAL | Age: 78
End: 2023-07-14
Attending: INTERNAL MEDICINE
Payer: MEDICARE

## 2023-07-14 DIAGNOSIS — N18.31 STAGE 3A CHRONIC KIDNEY DISEASE: ICD-10-CM

## 2023-07-14 LAB
ALBUMIN/CREAT UR: 22.9 UG/MG (ref 0–30)
BACTERIA #/AREA URNS AUTO: ABNORMAL /HPF
BILIRUB UR QL STRIP: NEGATIVE
CLARITY UR REFRACT.AUTO: CLEAR
COLOR UR AUTO: YELLOW
CREAT UR-MCNC: 262 MG/DL (ref 23–375)
GLUCOSE UR QL STRIP: NEGATIVE
HGB UR QL STRIP: NEGATIVE
HYALINE CASTS UR QL AUTO: 26 /LPF
KETONES UR QL STRIP: NEGATIVE
LEUKOCYTE ESTERASE UR QL STRIP: ABNORMAL
MICROALBUMIN UR DL<=1MG/L-MCNC: 60 UG/ML
MICROSCOPIC COMMENT: ABNORMAL
NITRITE UR QL STRIP: NEGATIVE
PH UR STRIP: 5 [PH] (ref 5–8)
PROT UR QL STRIP: ABNORMAL
SP GR UR STRIP: 1.02 (ref 1–1.03)
SQUAMOUS #/AREA URNS AUTO: 1 /HPF
URN SPEC COLLECT METH UR: ABNORMAL
WBC #/AREA URNS AUTO: 15 /HPF (ref 0–5)

## 2023-07-14 PROCEDURE — 81001 URINALYSIS AUTO W/SCOPE: CPT | Performed by: INTERNAL MEDICINE

## 2023-07-14 PROCEDURE — 82570 ASSAY OF URINE CREATININE: CPT | Performed by: INTERNAL MEDICINE

## 2023-07-14 PROCEDURE — 81003 URINALYSIS AUTO W/O SCOPE: CPT | Performed by: INTERNAL MEDICINE

## 2023-10-04 RX ORDER — PANTOPRAZOLE SODIUM 20 MG/1
20 TABLET, DELAYED RELEASE ORAL
Qty: 90 TABLET | Refills: 3 | Status: SHIPPED | OUTPATIENT
Start: 2023-10-04 | End: 2024-01-11

## 2023-10-04 NOTE — TELEPHONE ENCOUNTER
Refill Decision Note      Refill Decision Note   Malik Rosenberg  is requesting a refill authorization.  Brief Assessment and Rationale for Refill:  Approve     Medication Therapy Plan:         Comments:     Note composed:9:51 AM 10/04/2023             Appointments     Last Visit   7/13/2023 Rashard Carver Jr., MD   Next Visit   Visit date not found Rashard Carver Jr., MD

## 2023-10-04 NOTE — TELEPHONE ENCOUNTER
No care due was identified.  Health [facility] Embedded Care Due Messages. Reference number: [id number].   10/04/2023 4:34:24 AM CDT

## 2023-10-14 NOTE — TELEPHONE ENCOUNTER
Care Due:                  Date            Visit Type   Department     Provider  --------------------------------------------------------------------------------                                EP -                              PRIMARY      [facility] INTERNAL  Last Visit: 07-      CARE ([facility])   MEDICINE       Rashard Carver  Next Visit: None Scheduled  None         None Found                                                            Last  Test          Frequency    Reason                     Performed    Due Date  --------------------------------------------------------------------------------    HBA1C.......  6 months...  metFORMIN................  07- 01-    Health [facility] Embedded Care Due Messages. Reference number: [id number].   10/14/2023 4:34:24 AM CDT

## 2023-10-15 NOTE — TELEPHONE ENCOUNTER
Refill Routing Note   Medication(s) are not appropriate for processing by [facility] for the following reason(s):      No active prescription written by provider    [facility] action(s):  Defer Care Due:  Labs due            Appointments  past 12m or future 3m with PCP    Date Provider   Last Visit   7/13/2023 Rashard Carver Jr., MD   Next Visit   Visit date not found Rashard Carver Jr., MD   ED visits in past 90 days: 0        Note composed:9:26 PM 10/14/2023

## 2023-10-16 RX ORDER — AMLODIPINE BESYLATE 5 MG/1
TABLET ORAL
Qty: 90 TABLET | Refills: 2 | Status: SHIPPED | OUTPATIENT
Start: 2023-10-16

## 2024-01-11 RX ORDER — PANTOPRAZOLE SODIUM 20 MG/1
20 TABLET, DELAYED RELEASE ORAL
Qty: 90 TABLET | Refills: 3 | Status: SHIPPED | OUTPATIENT
Start: 2024-01-11

## 2024-02-27 DIAGNOSIS — Z00.00 ENCOUNTER FOR MEDICARE ANNUAL WELLNESS EXAM: ICD-10-CM

## 2024-04-11 ENCOUNTER — PATIENT MESSAGE (OUTPATIENT)
Dept: ADMINISTRATIVE | Facility: HOSPITAL | Age: 79
End: 2024-04-11
Payer: COMMERCIAL

## 2024-06-10 ENCOUNTER — PATIENT MESSAGE (OUTPATIENT)
Dept: INTERNAL MEDICINE | Facility: CLINIC | Age: 79
End: 2024-06-10
Payer: COMMERCIAL

## 2024-06-26 DIAGNOSIS — E11.9 TYPE 2 DIABETES MELLITUS WITH HEMOGLOBIN A1C GOAL OF LESS THAN 7.0%: ICD-10-CM

## 2024-06-26 DIAGNOSIS — E11.69 TYPE 2 DIABETES MELLITUS WITH OTHER SPECIFIED COMPLICATION, WITHOUT LONG-TERM CURRENT USE OF INSULIN: ICD-10-CM

## 2024-06-26 NOTE — TELEPHONE ENCOUNTER
Care Due:                  Date            Visit Type   Department     Provider  --------------------------------------------------------------------------------                                EP -                              PRIMARY      [facility] INTERNAL  Last Visit: 07-      CARE ([facility])   MEDICINE       Rashard Carver  Next Visit: None Scheduled  None         None Found                                                            Last  Test          Frequency    Reason                     Performed    Due Date  --------------------------------------------------------------------------------    Cr..........  12 months..  metFORMIN................  07- 07-    HBA1C.......  6 months...  metFORMIN................  07- 01-    Health [facility] Embedded Care Due Messages. Reference number: [id number].   6/26/2024 6:32:42 PM CDT

## 2024-06-27 RX ORDER — METFORMIN HYDROCHLORIDE 500 MG/1
500 TABLET, EXTENDED RELEASE ORAL 2 TIMES DAILY WITH MEALS
Qty: 180 TABLET | Refills: 0 | Status: SHIPPED | OUTPATIENT
Start: 2024-06-27

## 2024-06-27 NOTE — TELEPHONE ENCOUNTER
Refill Routing Note   Medication(s) are not appropriate for processing by [facility] for the following reason(s):        Required labs outdated  Responsible provider unclear    [facility] action(s):  Defer     Requires labs : Yes             Appointments  past 12m or future 3m with PCP    Date Provider   Last Visit   7/13/2023 Rashard Carver Jr., MD   Next Visit   Visit date not found Rashard Carver Jr., MD   ED visits in past 90 days: 0        Note composed:8:49 PM 06/26/2024

## 2024-07-02 ENCOUNTER — PATIENT MESSAGE (OUTPATIENT)
Dept: ENDOCRINOLOGY | Facility: CLINIC | Age: 79
End: 2024-07-02
Payer: COMMERCIAL

## 2024-07-02 ENCOUNTER — PATIENT MESSAGE (OUTPATIENT)
Dept: UROLOGY | Facility: CLINIC | Age: 79
End: 2024-07-02
Payer: COMMERCIAL

## 2024-07-02 DIAGNOSIS — E11.69 TYPE 2 DIABETES MELLITUS WITH OTHER SPECIFIED COMPLICATION, WITHOUT LONG-TERM CURRENT USE OF INSULIN: Primary | ICD-10-CM

## 2024-07-02 DIAGNOSIS — E55.9 VITAMIN D DEFICIENCY: ICD-10-CM

## 2024-07-14 ENCOUNTER — PATIENT MESSAGE (OUTPATIENT)
Dept: ENDOCRINOLOGY | Facility: CLINIC | Age: 79
End: 2024-07-14
Payer: COMMERCIAL

## 2024-07-14 ENCOUNTER — PATIENT MESSAGE (OUTPATIENT)
Dept: UROLOGY | Facility: CLINIC | Age: 79
End: 2024-07-14
Payer: COMMERCIAL

## 2024-08-07 DIAGNOSIS — N40.0 BPH WITHOUT URINARY OBSTRUCTION: ICD-10-CM

## 2024-08-08 RX ORDER — ALFUZOSIN HYDROCHLORIDE 10 MG/1
TABLET, EXTENDED RELEASE ORAL
Qty: 90 TABLET | Refills: 3 | Status: SHIPPED | OUTPATIENT
Start: 2024-08-08

## 2024-09-23 DIAGNOSIS — E11.69 TYPE 2 DIABETES MELLITUS WITH OTHER SPECIFIED COMPLICATION, WITHOUT LONG-TERM CURRENT USE OF INSULIN: ICD-10-CM

## 2024-09-23 DIAGNOSIS — E11.9 TYPE 2 DIABETES MELLITUS WITH HEMOGLOBIN A1C GOAL OF LESS THAN 7.0%: ICD-10-CM

## 2024-09-23 NOTE — TELEPHONE ENCOUNTER
Care Due:                  Date            Visit Type   Department     Provider  --------------------------------------------------------------------------------                                EP -                              PRIMARY      [facility] INTERNAL  Last Visit: 07-      CARE ([facility])   MEDICINE       Rashard Carver  Next Visit: None Scheduled  None         None Found                                                            Last  Test          Frequency    Reason                     Performed    Due Date  --------------------------------------------------------------------------------    Office Visit  15 months..  albuterol, metFORMIN,      07-   10-                             pantoprazole.............    Cr..........  12 months..  metFORMIN................  07- 07-    HBA1C.......  6 months...  metFORMIN................  07- 01-    [facility] Embedded Care Due Messages. Reference number: [id number].   9/23/2024 3:45:51 PM CDT

## 2024-09-24 RX ORDER — METFORMIN HYDROCHLORIDE 500 MG/1
500 TABLET, EXTENDED RELEASE ORAL 2 TIMES DAILY WITH MEALS
Qty: 180 TABLET | Refills: 3 | Status: SHIPPED | OUTPATIENT
Start: 2024-09-24

## 2024-09-24 NOTE — TELEPHONE ENCOUNTER
Refill Routing Note   Medication(s) are not appropriate for processing by [facility] for the following reason(s):        Required labs outdated    [facility] action(s):  Defer   Requires appointment : Yes     Requires labs : Yes             Appointments  past 12m or future 3m with PCP    Date Provider   Last Visit   7/13/2023 Rashard Carver Jr., MD   Next Visit   Visit date not found Rashard Carver Jr., MD   ED visits in past 90 days: 0        Note composed:10:47 AM 09/24/2024

## 2024-10-16 ENCOUNTER — PATIENT MESSAGE (OUTPATIENT)
Dept: ADMINISTRATIVE | Facility: HOSPITAL | Age: 79
End: 2024-10-16
Payer: COMMERCIAL

## 2024-11-08 NOTE — TELEPHONE ENCOUNTER
Spoke to pt reminding about PET stress on Friday with instructions. Pt verbalized understanding.  
none

## 2025-03-31 ENCOUNTER — PATIENT MESSAGE (OUTPATIENT)
Dept: INTERNAL MEDICINE | Facility: CLINIC | Age: 80
End: 2025-03-31
Payer: COMMERCIAL

## 2025-04-02 ENCOUNTER — PATIENT MESSAGE (OUTPATIENT)
Dept: INTERNAL MEDICINE | Facility: CLINIC | Age: 80
End: 2025-04-02
Payer: COMMERCIAL